# Patient Record
Sex: FEMALE | Race: WHITE | NOT HISPANIC OR LATINO | Employment: OTHER | URBAN - METROPOLITAN AREA
[De-identification: names, ages, dates, MRNs, and addresses within clinical notes are randomized per-mention and may not be internally consistent; named-entity substitution may affect disease eponyms.]

---

## 2018-05-14 PROBLEM — S80.02XA CONTUSION OF LEFT KNEE: Status: ACTIVE | Noted: 2018-05-14

## 2018-05-14 PROBLEM — R06.02 SHORTNESS OF BREATH: Status: ACTIVE | Noted: 2018-05-14

## 2018-05-14 PROBLEM — R09.89 PULMONARY VASCULAR CONGESTION: Status: ACTIVE | Noted: 2018-05-14

## 2018-05-15 PROBLEM — I50.43 ACUTE ON CHRONIC COMBINED SYSTOLIC AND DIASTOLIC HEART FAILURE: Status: ACTIVE | Noted: 2018-05-14

## 2018-05-16 PROBLEM — I50.43 ACUTE ON CHRONIC COMBINED SYSTOLIC AND DIASTOLIC HEART FAILURE: Status: RESOLVED | Noted: 2018-05-14 | Resolved: 2018-05-16

## 2018-05-16 PROBLEM — S80.02XA CONTUSION OF LEFT KNEE: Status: RESOLVED | Noted: 2018-05-14 | Resolved: 2018-05-16

## 2018-05-16 PROBLEM — R06.02 SHORTNESS OF BREATH: Status: RESOLVED | Noted: 2018-05-14 | Resolved: 2018-05-16

## 2018-08-03 ENCOUNTER — OFFICE VISIT (OUTPATIENT)
Dept: PRIMARY CARE CLINIC | Facility: CLINIC | Age: 83
End: 2018-08-03
Payer: MEDICARE

## 2018-08-03 VITALS
HEIGHT: 64 IN | DIASTOLIC BLOOD PRESSURE: 73 MMHG | HEART RATE: 83 BPM | OXYGEN SATURATION: 98 % | SYSTOLIC BLOOD PRESSURE: 133 MMHG | TEMPERATURE: 98 F | RESPIRATION RATE: 18 BRPM | WEIGHT: 204 LBS | BODY MASS INDEX: 34.83 KG/M2

## 2018-08-03 DIAGNOSIS — G20.A1 PARKINSON DISEASE: ICD-10-CM

## 2018-08-03 DIAGNOSIS — E11.42 PERIPHERAL SENSORY NEUROPATHY DUE TO TYPE 2 DIABETES MELLITUS: ICD-10-CM

## 2018-08-03 DIAGNOSIS — E78.5 TYPE 2 DIABETES MELLITUS WITH HYPERLIPIDEMIA: ICD-10-CM

## 2018-08-03 DIAGNOSIS — E11.69 TYPE 2 DIABETES MELLITUS WITH HYPERLIPIDEMIA: ICD-10-CM

## 2018-08-03 DIAGNOSIS — I50.42 CHRONIC COMBINED SYSTOLIC AND DIASTOLIC HEART FAILURE: ICD-10-CM

## 2018-08-03 DIAGNOSIS — E78.5 HYPERLIPIDEMIA, UNSPECIFIED HYPERLIPIDEMIA TYPE: ICD-10-CM

## 2018-08-03 DIAGNOSIS — Z85.038 HISTORY OF COLON CANCER: ICD-10-CM

## 2018-08-03 DIAGNOSIS — Z95.818 STATUS POST PLACEMENT OF IMPLANTABLE LOOP RECORDER: ICD-10-CM

## 2018-08-03 DIAGNOSIS — R29.6 FREQUENT FALLS: ICD-10-CM

## 2018-08-03 DIAGNOSIS — S61.213D LACERATION OF LEFT MIDDLE FINGER WITHOUT FOREIGN BODY WITHOUT DAMAGE TO NAIL, SUBSEQUENT ENCOUNTER: ICD-10-CM

## 2018-08-03 DIAGNOSIS — G25.81 RLS (RESTLESS LEGS SYNDROME): ICD-10-CM

## 2018-08-03 DIAGNOSIS — S20.211D RIB CONTUSION, RIGHT, SUBSEQUENT ENCOUNTER: Primary | ICD-10-CM

## 2018-08-03 DIAGNOSIS — Z90.49 H/O HEMICOLECTOMY: ICD-10-CM

## 2018-08-03 DIAGNOSIS — I10 ESSENTIAL HYPERTENSION, BENIGN: ICD-10-CM

## 2018-08-03 DIAGNOSIS — Z79.01 CHRONIC ANTICOAGULATION: ICD-10-CM

## 2018-08-03 DIAGNOSIS — S30.0XXA CONTUSION OF COCCYX, INITIAL ENCOUNTER: ICD-10-CM

## 2018-08-03 PROCEDURE — 99999 PR PBB SHADOW E&M-NEW PATIENT-LVL V: CPT | Mod: PBBFAC,,, | Performed by: FAMILY MEDICINE

## 2018-08-03 PROCEDURE — 99203 OFFICE O/P NEW LOW 30 MIN: CPT | Mod: S$GLB,,, | Performed by: FAMILY MEDICINE

## 2018-08-03 RX ORDER — GABAPENTIN 100 MG/1
100 CAPSULE ORAL 3 TIMES DAILY
Qty: 90 CAPSULE | Refills: 2 | Status: SHIPPED | OUTPATIENT
Start: 2018-08-03 | End: 2018-10-25 | Stop reason: SDUPTHER

## 2018-08-03 NOTE — PROGRESS NOTES
Subjective:       Patient ID: Evita Cuevas is a 88 y.o. female.    Chief Complaint: Establish Care and Follow-up (patient was seen in the ER on Monday due to falling )    Here to establish primary care services.  She had been going to multiple specialist at [facility].  Has been seeing an endocrinologist there, as well.  Reports that her neurologist is trying to take her off of her Parkinson's meds, though she is not exactly sure why.  She has also been anticoagulated for as long as she can remember, though she is unclear as to the indication for her anticoagulation.  She denies any history of blood clots.  Does have a history of colon cancer, resected via right hemicolectomy in 2015.  Patient is also here for ER follow-up.  She fell at home several days ago.  Says she just lost her balance and fell, sustained a laceration to her left 3rd finger, rib contusion, and hit her head on the way down.  No loss of consciousness.  In the ER, head CT was negative.  Chest x-ray was clear.  X-ray of shoulder showed questionable right AC separation.  Finger laceration was sutured.  Since her fall, she has had increasing right-sided rib pain, as well as pain in her tailbone.  No further falls since earlier in the week, but she does have a history of repeated falls in the past.  Lives alone, depends upon friends for assistance and transportation.      Review of Systems   Constitutional: Negative for fever.   HENT: Negative for trouble swallowing.    Eyes: Negative for visual disturbance.   Respiratory: Negative for shortness of breath.    Cardiovascular: Positive for chest pain and leg swelling. Negative for palpitations.   Gastrointestinal: Negative for blood in stool.   Endocrine: Negative for polydipsia and polyuria.   Genitourinary: Negative for difficulty urinating and hematuria.   Musculoskeletal: Positive for gait problem.   Skin: Positive for wound.   Neurological: Positive for numbness.   Hematological:  "Bruises/bleeds easily.   Psychiatric/Behavioral: Negative for agitation and confusion.       Objective:      Vitals:    08/03/18 0906   BP: 133/73   BP Location: Left arm   Patient Position: Sitting   BP Method: Large (Automatic)   Pulse: 83   Resp: 18   Temp: 98.1 °F (36.7 °C)   TempSrc: Oral   SpO2: 98%   Weight: 92.5 kg (204 lb)   Height: 5' 4" (1.626 m)     Physical Exam   Constitutional: She is oriented to person, place, and time. She appears well-developed and well-nourished.   HENT:   Head: Normocephalic and atraumatic.   Eyes: EOM are normal.   Neck: No JVD present.   Cardiovascular: Normal rate, regular rhythm and normal heart sounds.    Pulmonary/Chest: Effort normal and breath sounds normal.         She exhibits tenderness. She exhibits no crepitus, no edema and no swelling.       Musculoskeletal: She exhibits edema (1+ to mid-calf on right, 2+ on left).        Lumbar back: She exhibits bony tenderness. She exhibits no deformity.        Back:         Hands:  Neurological: She is alert and oriented to person, place, and time.   Skin: Skin is warm and dry.   Psychiatric: She has a normal mood and affect. Her behavior is normal.   Nursing note and vitals reviewed.      Assessment:       1. Rib contusion, right, subsequent encounter    2. Essential hypertension, benign    3. Type 2 diabetes mellitus with hyperlipidemia    4. Hyperlipidemia, unspecified hyperlipidemia type    5. Chronic combined systolic and diastolic heart failure    6. Contusion of coccyx, initial encounter    7. Laceration of left middle finger without foreign body without damage to nail, subsequent encounter    8. Frequent falls    9. 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 Status post placement of implantable loop recorder    11. History of colon cancer    12. H/O hemicolectomy    13. Chronic anticoagulation    14. RLS (restless legs syndrome)    15. Parkinson disease        Plan:       Rib contusion, right, " subsequent encounter  Comments:  X-rays today  Orders:  -     XR Ribs Min 3 Views w/PA Chest Right; Future; Expected date: 08/03/2018    Essential hypertension, benign    Type 2 diabetes mellitus with hyperlipidemia    Hyperlipidemia, unspecified hyperlipidemia type    Chronic combined systolic and diastolic heart failure  Comments:  Has transitioned care to Dr. 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  Dispense: 90 capsule; Refill: 2    Status post placement of implantable loop recorder    History of colon cancer    H/O hemicolectomy    Chronic anticoagulation  Comments:  Unclear indication for anticoagulation, will request records from her previous cardiologist    RLS (restless legs syndrome)    Parkinson disease  Comments:  followed by Dr. Romo at     Need to get medical records from previous PCP, Dr. Dinero, and cardiologist, Dr. Londono  Medication List with Changes/Refills   New Medications    GABAPENTIN (NEURONTIN) 100 MG CAPSULE    Take 1 capsule (100 mg total) by mouth 3 (three) times daily.   Current Medications    APIXABAN (ELIQUIS) 5 MG TAB    Take 5 mg by mouth 2 (two) times daily.    ATORVASTATIN (LIPITOR) 20 MG TABLET    Take 20 mg by mouth once daily.    BRIMONIDINE 0.1% (ALPHAGAN P) 0.1 % DROP    Place 1 drop into both eyes 3 (three) times daily.    CARBIDOPA-LEVODOPA (PARCOPA)  MG PER DISINTEGRATING TABLET    Take 1 tablet by mouth 3 (three) times daily.    DULOXETINE (CYMBALTA) 30 MG CAPSULE    Take 30 mg by  mouth once daily.    FUROSEMIDE (LASIX) 20 MG TABLET    Take 1 tablet (20 mg total) by mouth once daily.    GLIMEPIRIDE (AMARYL) 4 MG TABLET    Take 4 mg by mouth before breakfast.    ISOSORBIDE MONONITRATE (IMDUR) 30 MG 24 HR TABLET    Take 30 mg by mouth once daily.    MELOXICAM (MOBIC) 7.5 MG TABLET    Take 7.5 mg by mouth once daily.    METFORMIN (GLUCOPHAGE) 500 MG TABLET    Take 500 mg by mouth 2 (two) times daily with meals.    METOPROLOL SUCCINATE (TOPROL-XL) 50 MG 24 HR TABLET    Take 1 tablet (50 mg total) by mouth once daily.    QUINAPRIL (ACCUPRIL) 10 MG TABLET    Take 1 tablet (10 mg total) by mouth every evening.    ROPINIROLE (REQUIP) 2 MG TABLET    Take 2 mg by mouth every evening.    SITAGLIPTIN (JANUVIA) 50 MG TAB    Take 100 mg by mouth once daily.    SPIRONOLACTONE (ALDACTONE) 25 MG TABLET    Take 1 tablet (25 mg total) by mouth once daily.   Discontinued Medications    CIPROFLOXACIN HCL (CIPRO) 500 MG TABLET    Take 500 mg by mouth 2 (two) times daily.

## 2018-08-06 ENCOUNTER — TELEPHONE (OUTPATIENT)
Dept: PRIMARY CARE CLINIC | Facility: CLINIC | Age: 83
End: 2018-08-06

## 2018-08-06 NOTE — TELEPHONE ENCOUNTER
----- Message from Ramona Deshpande sent at 8/6/2018  2:36 PM CDT -----  Contact: Patient  Type:  Test Results    Who Called:  [facility]  Name of Test (Lab/Mammo/Etc):  Xray  Date of Test:  08/03/2018  Ordering Provider:  Dr Velazquez  Where the test was performed:  [facility]  Best Call Back Number:  [phone]  Additional Information:  Please call her. Thanks.

## 2018-08-13 ENCOUNTER — OFFICE VISIT (OUTPATIENT)
Dept: PRIMARY CARE CLINIC | Facility: CLINIC | Age: 83
End: 2018-08-13
Payer: MEDICARE

## 2018-08-13 VITALS
HEART RATE: 75 BPM | DIASTOLIC BLOOD PRESSURE: 75 MMHG | WEIGHT: 201.63 LBS | TEMPERATURE: 98 F | BODY MASS INDEX: 34.42 KG/M2 | OXYGEN SATURATION: 96 % | SYSTOLIC BLOOD PRESSURE: 131 MMHG | RESPIRATION RATE: 18 BRPM | HEIGHT: 64 IN

## 2018-08-13 DIAGNOSIS — M79.605 PAIN AND SWELLING OF LEFT LOWER EXTREMITY: Primary | ICD-10-CM

## 2018-08-13 DIAGNOSIS — L03.116 LEFT LEG CELLULITIS: ICD-10-CM

## 2018-08-13 DIAGNOSIS — M79.89 PAIN AND SWELLING OF LEFT LOWER EXTREMITY: Primary | ICD-10-CM

## 2018-08-13 PROCEDURE — 99214 OFFICE O/P EST MOD 30 MIN: CPT | Mod: S$GLB,,, | Performed by: NURSE PRACTITIONER

## 2018-08-13 PROCEDURE — 99999 PR PBB SHADOW E&M-EST. PATIENT-LVL V: CPT | Mod: PBBFAC,,, | Performed by: NURSE PRACTITIONER

## 2018-08-13 RX ORDER — ROPINIROLE 1 MG/1
2 TABLET, FILM COATED ORAL 2 TIMES DAILY
COMMUNITY
Start: 2018-08-08 | End: 2018-10-01 | Stop reason: SDUPTHER

## 2018-08-13 RX ORDER — CLINDAMYCIN HYDROCHLORIDE 300 MG/1
300 CAPSULE ORAL EVERY 8 HOURS
Qty: 30 CAPSULE | Refills: 0 | Status: ON HOLD | OUTPATIENT
Start: 2018-08-13 | End: 2018-09-19 | Stop reason: SDUPTHER

## 2018-08-13 NOTE — PROGRESS NOTES
Chief Complaint  Chief Complaint   Patient presents with    Suture / Staple Removal       HPI  Evita Cuevas is a 88 y.o. female with multiple medical diagnoses as listed in the medical history and problem list that presents for suture removal, left lower extremity swelling, tailbone pain.    Suture removal-patient seen in the ER July 30th for left middle finger laceration status post fall.  Stitches x5 to left middle finger.  States that she  lost  1 over stitches several days ago.  Presents today for removal of sutures.  Have been in total of 13 days.  Denies pain or swelling to the area.  No drainage.    No fever or chills.  Has been treating with over-the-counter Neosporin daily.    Left lower extremity swelling-patient reports the onset of left lower extremity swelling over a year ago.  Reports that she had several ultrasounds negative for DVT at the time.  Presents today with complaints of increased swelling increased redness increased pain to the posterior calf.  History of surgical intervention in the knee?  Ankle?  On that leg.  Patient is currently on Eliquis for anticoagulation-she is unaware of why she needs anticoagulation.    The tailbone pain-patient reports worsening tail bone pain 10/10.  Described as sharp, stabbing, increased with movement.  Status post fall on 07/30 to the ER with completion of x-ray of tail bone that was negative for fracture.      PAST MEDICAL HISTORY:  Past Medical History:   Diagnosis Date    AICD (automatic cardioverter/defibrillator) present     Anticoagulant long-term use     Arthritis     Cancer     Colon cancer     Depression     Diabetes mellitus     Falls     Glaucoma     Hypertension     Incontinence     Pacemaker     Restless leg syndrome        PAST SURGICAL HISTORY:  Past Surgical History:   Procedure Laterality Date    APPENDECTOMY      COLON SURGERY      TONSILLECTOMY         SOCIAL HISTORY:  Social History     Socioeconomic History     Marital status:      Spouse name: Not on file    Number of children: Not on file    Years of education: Not on file    Highest education level: Not on file   Social Needs    Financial resource strain: Not on file    Food insecurity - worry: Not on file    Food insecurity - inability: Not on file    Transportation needs - medical: Not on file    Transportation needs - non-medical: Not on file   Occupational History    Not on file   Tobacco Use    Smoking status: Never Smoker    Smokeless tobacco: Never Used   Substance and Sexual Activity    Alcohol use: No    Drug use: No    Sexual activity: Not on file   Other Topics Concern    Not on file   Social History Narrative    Not on file       FAMILY HISTORY:  Family History   Problem Relation Age of Onset    Heart disease Father        ALLERGIES AND MEDICATIONS: updated and reviewed.  Review of patient's allergies indicates:   Allergen Reactions    Adhesive      Current Outpatient Medications   Medication Sig Dispense Refill    apixaban (ELIQUIS) 5 mg Tab Take 5 mg by mouth 2 (two) times daily.      atorvastatin (LIPITOR) 20 MG tablet Take 20 mg by mouth once daily.      brimonidine 0.1% (ALPHAGAN P) 0.1 % Drop Place 1 drop into both eyes 3 (three) times daily.      carbidopa-levodopa (PARCOPA)  mg per disintegrating tablet Take 1 tablet by mouth 3 (three) times daily.      DULoxetine (CYMBALTA) 30 MG capsule Take 30 mg by mouth once daily.      furosemide (LASIX) 20 MG tablet Take 1 tablet (20 mg total) by mouth once daily. 30 tablet 11    gabapentin (NEURONTIN) 100 MG capsule Take 1 capsule (100 mg total) by mouth 3 (three) times daily. 90 capsule 2    glimepiride (AMARYL) 4 MG tablet Take 4 mg by mouth before breakfast.      isosorbide mononitrate (IMDUR) 30 MG 24 hr tablet Take 30 mg by mouth once daily.      meloxicam (MOBIC) 7.5 MG tablet Take 7.5 mg by mouth once daily.      metFORMIN (GLUCOPHAGE) 500 MG tablet Take 500 mg  "by mouth 2 (two) times daily with meals.      metoprolol succinate (TOPROL-XL) 50 MG 24 hr tablet Take 1 tablet (50 mg total) by mouth once daily. 30 tablet 11    quinapril (ACCUPRIL) 10 MG tablet Take 1 tablet (10 mg total) by mouth every evening. 90 tablet 3    rOPINIRole (REQUIP) 1 MG tablet Take 2 tablets by mouth 2 (two) times daily.      SITagliptin (JANUVIA) 50 MG Tab Take 100 mg by mouth once daily.      spironolactone (ALDACTONE) 25 MG tablet Take 1 tablet (25 mg total) by mouth once daily. 30 tablet 11     No current facility-administered medications for this visit.          ROS  Review of Systems   Constitutional: Negative for chills, fatigue and fever.   HENT: Negative for congestion, rhinorrhea, sinus pressure and sore throat.    Respiratory: Negative for cough, chest tightness and shortness of breath.    Cardiovascular: Positive for leg swelling (Left lower extremity swelling). Negative for chest pain and palpitations.   Gastrointestinal: Negative for blood in stool, diarrhea, nausea and vomiting.   Genitourinary: Negative for dysuria, frequency, hematuria and urgency.   Musculoskeletal: Positive for arthralgias and joint swelling. Negative for back pain.   Skin: Negative for rash and wound.   Neurological: Positive for numbness. Negative for dizziness and headaches.   Psychiatric/Behavioral: Negative for dysphoric mood and sleep disturbance. The patient is not nervous/anxious.          PHYSICAL EXAM  Vitals:    08/13/18 1318   BP: 131/75   BP Location: Right arm   Patient Position: Sitting   BP Method: Medium (Automatic)   Pulse: 75   Resp: 18   Temp: 98 °F (36.7 °C)   TempSrc: Oral   SpO2: 96%   Weight: 91.4 kg (201 lb 9.6 oz)   Height: 5' 4" (1.626 m)    Body mass index is 34.6 kg/m².  Weight: 91.4 kg (201 lb 9.6 oz)   Height: 5' 4" (162.6 cm)     Physical Exam   Constitutional: She is oriented to person, place, and time. She appears well-developed and well-nourished.   HENT:   Head: " Normocephalic.   Right Ear: Tympanic membrane normal.   Left Ear: Tympanic membrane normal.   Mouth/Throat: Uvula is midline, oropharynx is clear and moist and mucous membranes are normal.   Eyes: Conjunctivae are normal.   Cardiovascular: Normal rate, regular rhythm and normal heart sounds.   No murmur heard.  Pulses:       Radial pulses are 2+ on the right side, and 2+ on the left side.        Dorsalis pedis pulses are 1+ on the right side, and 0 on the left side.   Left lower extremity swollen, tender to touch.  Erythematous and warm.   Pulmonary/Chest: Effort normal and breath sounds normal. She has no wheezes.   Abdominal: Soft. Bowel sounds are normal. There is no tenderness.   Musculoskeletal: She exhibits no edema.   Feet:   Right Foot:   Protective Sensation: 6 sites tested. 0 sites sensed.   Skin Integrity: Positive for callus. Negative for skin breakdown.   Left Foot:   Protective Sensation: 6 sites tested. 2 sites sensed.   Skin Integrity: Positive for callus. Negative for skin breakdown.   Lymphadenopathy:     She has no cervical adenopathy.   Neurological: She is alert and oriented to person, place, and time.   Skin: Skin is warm and dry. No rash noted.   Psychiatric: She has a normal mood and affect.         Health Maintenance       Date Due Completion Date    Lipid Panel 06/07/1930 ---    Foot Exam 06/07/1940 ---    Eye Exam 06/07/1940 ---    TETANUS VACCINE 06/07/1948 ---    Zoster Vaccine 06/07/1990 ---    Pneumococcal (65+) (1 of 2 - PCV13) 06/07/1995 ---    Influenza Vaccine 08/01/2018 ---    Hemoglobin A1c 11/15/2018 5/15/2018            Assessment & Plan      Evita was seen today for suture / staple removal.    Diagnoses and all orders for this visit:    Pain and swelling of left lower extremity  -     Cancel: US Lower Extremity Veins Left; Future  -     US Lower Extremity Veins Left; Future    Left leg cellulitis  -     US Lower Extremity Veins Left; Future  -     clindamycin (CLEOCIN) 300 MG  capsule; Take 1 capsule (300 mg total) by mouth every 8 (eight) hours.              Follow-up: No Follow-up on file.

## 2018-08-24 ENCOUNTER — TELEPHONE (OUTPATIENT)
Dept: PRIMARY CARE CLINIC | Facility: CLINIC | Age: 83
End: 2018-08-24

## 2018-08-24 NOTE — TELEPHONE ENCOUNTER
----- Message from Gracie Lopez sent at 8/24/2018  9:05 AM CDT -----  Contact: [phone]  Patient is requesting a call back from the nurse.    Please call the patient upon request at phone number [phone].

## 2018-08-24 NOTE — TELEPHONE ENCOUNTER
----- Message from Rebeca Perez sent at 8/24/2018 11:09 AM CDT -----  Contact: pt  Type:  Patient Returning Call    Who Called: Evita Cuevas (Pt)  Who Left Message for Patient: no message  Does the patient know what this is regarding?:  Not sure   Best Call Back Number:    Additional Information:  Pt states she was expecting a call from nurse  Please  Call pt to advise  Thanks

## 2018-08-24 NOTE — TELEPHONE ENCOUNTER
Patient says she receive a letter in the mail that the medical release was sent to the wrong place. I told her bring the letter in with her on Monday and we will fill a new out when she comes in. She states understanding

## 2018-08-27 ENCOUNTER — OFFICE VISIT (OUTPATIENT)
Dept: PRIMARY CARE CLINIC | Facility: CLINIC | Age: 83
End: 2018-08-27
Payer: MEDICARE

## 2018-08-27 VITALS
BODY MASS INDEX: 34.31 KG/M2 | HEIGHT: 64 IN | DIASTOLIC BLOOD PRESSURE: 45 MMHG | TEMPERATURE: 98 F | OXYGEN SATURATION: 98 % | WEIGHT: 201 LBS | SYSTOLIC BLOOD PRESSURE: 85 MMHG | HEART RATE: 65 BPM | RESPIRATION RATE: 18 BRPM

## 2018-08-27 DIAGNOSIS — I95.9 HYPOTENSION, UNSPECIFIED HYPOTENSION TYPE: ICD-10-CM

## 2018-08-27 DIAGNOSIS — R42 DIZZINESS: ICD-10-CM

## 2018-08-27 DIAGNOSIS — R06.02 SHORTNESS OF BREATH: ICD-10-CM

## 2018-08-27 DIAGNOSIS — L03.116 CELLULITIS OF LEFT LOWER EXTREMITY: Primary | ICD-10-CM

## 2018-08-27 DIAGNOSIS — E78.5 HYPERLIPIDEMIA, UNSPECIFIED HYPERLIPIDEMIA TYPE: ICD-10-CM

## 2018-08-27 DIAGNOSIS — I10 ESSENTIAL HYPERTENSION, BENIGN: ICD-10-CM

## 2018-08-27 PROCEDURE — 99214 OFFICE O/P EST MOD 30 MIN: CPT | Mod: S$GLB,,, | Performed by: NURSE PRACTITIONER

## 2018-08-27 PROCEDURE — 99999 PR PBB SHADOW E&M-EST. PATIENT-LVL V: CPT | Mod: PBBFAC,,, | Performed by: NURSE PRACTITIONER

## 2018-08-27 NOTE — PROGRESS NOTES
"Chief Complaint  Chief Complaint   Patient presents with    Follow-up     cellulitis       HPI  Evita Cuevas is a 88 y.o. female with multiple medical diagnoses as listed in the medical history and problem list that presents for left leg swelling, dizziness, sob.    Left lower extremity swelling-patient previously seen 1 week ago for left lower extremity swelling.  Venous ultrasound completed without noted due to a venous reflux.  Started on clindamycin for possible cellulitis.  Has completed clindamycin x7 days today.  Presents to clinic with no improvement lower extremity swelling.  Complaining of tenderness to touch in increased erythema.  States that she has had this swelling previously in the past but never this bad.    Fatigue-reports new onset fatigue, dizziness, shortness of breath.  Patient reports being "winded" with any movement.  No syncope.  Presents to clinic with hypotension.  Manual blood pressure 85/45.  Reports possible dehydration with decreased fluid intake due to her fatigue.  No cough.  No fever or chills.      Epigastric pain - Also reports an episode of "indigestion" 3 days ago in the middle of the night.  Described as burning in her esophagus.  Accompanied by chest tightness and pressure.  Took nitroglycerin with no improvement in pain.  Pain lasts approximately 6 hr.  No noted triggers to cause reflux.  No associated vomiting.  No radiation.  No subsequent episodes.        PAST MEDICAL HISTORY:  Past Medical History:   Diagnosis Date    AICD (automatic cardioverter/defibrillator) present     Anticoagulant long-term use     Arthritis     Cancer     Colon cancer     Depression     Diabetes mellitus     Falls     Glaucoma     Hypertension     Incontinence     Pacemaker     Restless leg syndrome        PAST SURGICAL HISTORY:  Past Surgical History:   Procedure Laterality Date    APPENDECTOMY      COLON SURGERY      TONSILLECTOMY         SOCIAL HISTORY:  Social History "     Socioeconomic History    Marital status:      Spouse name: Not on file    Number of children: Not on file    Years of education: Not on file    Highest education level: Not on file   Social Needs    Financial resource strain: Not on file    Food insecurity - worry: Not on file    Food insecurity - inability: Not on file    Transportation needs - medical: Not on file    Transportation needs - non-medical: Not on file   Occupational History    Not on file   Tobacco Use    Smoking status: Never Smoker    Smokeless tobacco: Never Used   Substance and Sexual Activity    Alcohol use: No    Drug use: No    Sexual activity: Not on file   Other Topics Concern    Not on file   Social History Narrative    Not on file       FAMILY HISTORY:  Family History   Problem Relation Age of Onset    Heart disease Father        ALLERGIES AND MEDICATIONS: updated and reviewed.  Review of patient's allergies indicates:   Allergen Reactions    Adhesive      Current Outpatient Medications   Medication Sig Dispense Refill    apixaban (ELIQUIS) 5 mg Tab Take 5 mg by mouth 2 (two) times daily.      atorvastatin (LIPITOR) 20 MG tablet Take 20 mg by mouth once daily.      brimonidine 0.1% (ALPHAGAN P) 0.1 % Drop Place 1 drop into both eyes 3 (three) times daily.      carbidopa-levodopa (PARCOPA)  mg per disintegrating tablet Take 1 tablet by mouth 3 (three) times daily.      clindamycin (CLEOCIN) 300 MG capsule Take 1 capsule (300 mg total) by mouth every 8 (eight) hours. 30 capsule 0    DULoxetine (CYMBALTA) 30 MG capsule Take 30 mg by mouth once daily.      furosemide (LASIX) 20 MG tablet Take 1 tablet (20 mg total) by mouth once daily. 30 tablet 11    gabapentin (NEURONTIN) 100 MG capsule Take 1 capsule (100 mg total) by mouth 3 (three) times daily. 90 capsule 2    glimepiride (AMARYL) 4 MG tablet Take 4 mg by mouth before breakfast.      isosorbide mononitrate (IMDUR) 30 MG 24 hr tablet Take 30 mg  by mouth once daily.      meloxicam (MOBIC) 7.5 MG tablet Take 7.5 mg by mouth once daily.      metFORMIN (GLUCOPHAGE) 500 MG tablet Take 500 mg by mouth 2 (two) times daily with meals.      metoprolol succinate (TOPROL-XL) 50 MG 24 hr tablet Take 1 tablet (50 mg total) by mouth once daily. 30 tablet 11    quinapril (ACCUPRIL) 10 MG tablet Take 1 tablet (10 mg total) by mouth every evening. 90 tablet 3    rOPINIRole (REQUIP) 1 MG tablet Take 2 tablets by mouth 2 (two) times daily.      SITagliptin (JANUVIA) 50 MG Tab Take 100 mg by mouth once daily.      spironolactone (ALDACTONE) 25 MG tablet Take 1 tablet (25 mg total) by mouth once daily. 30 tablet 11     No current facility-administered medications for this visit.          ROS  Review of Systems   Constitutional: Positive for fatigue. Negative for chills and fever.   HENT: Negative for congestion, rhinorrhea, sinus pressure and sore throat.    Respiratory: Positive for shortness of breath. Negative for cough and chest tightness.    Cardiovascular: Positive for chest pain and leg swelling (Left lower extremity swelling chronically, worsening). Negative for palpitations.   Gastrointestinal: Positive for abdominal pain. Negative for blood in stool, diarrhea, nausea and vomiting.   Genitourinary: Negative for dysuria, frequency, hematuria and urgency.   Musculoskeletal: Negative for arthralgias and back pain.   Skin: Positive for rash. Negative for wound.   Neurological: Positive for dizziness, weakness and light-headedness. Negative for headaches.   Psychiatric/Behavioral: Negative for dysphoric mood and sleep disturbance. The patient is not nervous/anxious.          PHYSICAL EXAM  Vitals:    08/27/18 1118 08/27/18 1157   BP: (!) 106/47 (!) 85/45   BP Location: Right arm Left arm   Patient Position: Sitting Sitting   BP Method: Medium (Automatic) Medium (Manual)   Pulse: 69 65   Resp: 18    Temp: 97.8 °F (36.6 °C)    TempSrc: Oral    SpO2: 98%    Weight:  "91.2 kg (201 lb)    Height: 5' 4" (1.626 m)     Body mass index is 34.5 kg/m².  Weight: 91.2 kg (201 lb)   Height: 5' 4" (162.6 cm)     Physical Exam   Constitutional: She is oriented to person, place, and time. She appears well-developed and well-nourished. She appears lethargic.   Patient appears lethargic, fatigued.   HENT:   Head: Normocephalic.   Right Ear: Tympanic membrane normal.   Left Ear: Tympanic membrane normal.   Mouth/Throat: Uvula is midline, oropharynx is clear and moist and mucous membranes are normal.   Eyes: Conjunctivae are normal.   Cardiovascular: Normal rate, regular rhythm, normal heart sounds and normal pulses.   No murmur heard.  Pulses:       Radial pulses are 2+ on the right side, and 2+ on the left side.   +4 LE swelling noted to left lower extremity.  Accompanied by lower extremity wellness.  Tenderness to touch.  Scaly appearance.   Pulmonary/Chest: Effort normal and breath sounds normal. She has no wheezes.   Breath sounds equal bilaterally.  Crackles noted to left lower base.  No wheezing.   Abdominal: Soft. Bowel sounds are normal. There is no tenderness.   Musculoskeletal: She exhibits no edema.   Lymphadenopathy:     She has no cervical adenopathy.   Neurological: She is oriented to person, place, and time. She appears lethargic.   Skin: Skin is warm and dry. No rash noted.   Psychiatric: She has a normal mood and affect.         Health Maintenance       Date Due Completion Date    Lipid Panel 06/07/1930 ---    Eye Exam 06/07/1940 ---    TETANUS VACCINE 06/07/1948 ---    Zoster Vaccine 06/07/1990 ---    Pneumococcal (65+) (1 of 2 - PCV13) 06/07/1995 ---    Influenza Vaccine 08/01/2018 ---    Hemoglobin A1c 11/15/2018 5/15/2018    Foot Exam 08/13/2019 8/13/2018            Assessment & Plan    Evita was seen today for follow-up.    Diagnoses and all orders for this visit:    Cellulitis of left lower extremity  Lab work to rule out cellulitis?  Possible IV antibiotics for cellulitis " resistant to clindamycin?  Essential hypertension, benign  Hold antihypertensives at this time.    Hyperlipidemia, unspecified hyperlipidemia type    Hypotension, unspecified hypotension type  EKG completed.  Sinus bradycardia with PACs.  Left bundle branch block unchanged from previous EKG.  Manual blood pressure per NP 85/45.    Dizziness  Probably related to hypotension - no new blood pressure medications at this time.  Reports that she may be mildly dehydrated..  Patient needs lab work at this time.  Sent to the ER via wheelchair to determine cause of hypotension.    Shortness of breath  Chest x-ray per ER at this time.  Crackles noted left base.  Echo determine change in function?  Patient has not been a cardiologist in over 6 months.    Patient to ER via wheelchair.  Report called to ER MD.  Questions addressed.        Follow-up: No Follow-up on file.

## 2018-08-29 ENCOUNTER — OFFICE VISIT (OUTPATIENT)
Dept: PRIMARY CARE CLINIC | Facility: CLINIC | Age: 83
End: 2018-08-29
Payer: MEDICARE

## 2018-08-29 VITALS
BODY MASS INDEX: 34.95 KG/M2 | TEMPERATURE: 98 F | DIASTOLIC BLOOD PRESSURE: 87 MMHG | HEART RATE: 63 BPM | RESPIRATION RATE: 18 BRPM | WEIGHT: 204.69 LBS | HEIGHT: 64 IN | SYSTOLIC BLOOD PRESSURE: 112 MMHG | OXYGEN SATURATION: 99 %

## 2018-08-29 DIAGNOSIS — I95.9 HYPOTENSION, UNSPECIFIED HYPOTENSION TYPE: Primary | ICD-10-CM

## 2018-08-29 DIAGNOSIS — E78.5 TYPE 2 DIABETES MELLITUS WITH HYPERLIPIDEMIA: ICD-10-CM

## 2018-08-29 DIAGNOSIS — E11.69 TYPE 2 DIABETES MELLITUS WITH HYPERLIPIDEMIA: ICD-10-CM

## 2018-08-29 DIAGNOSIS — E78.5 HYPERLIPIDEMIA, UNSPECIFIED HYPERLIPIDEMIA TYPE: ICD-10-CM

## 2018-08-29 DIAGNOSIS — I10 ESSENTIAL HYPERTENSION, BENIGN: ICD-10-CM

## 2018-08-29 DIAGNOSIS — L03.116 CELLULITIS OF LEFT LOWER EXTREMITY: ICD-10-CM

## 2018-08-29 PROCEDURE — 99999 PR PBB SHADOW E&M-EST. PATIENT-LVL III: CPT | Mod: PBBFAC,,, | Performed by: FAMILY MEDICINE

## 2018-08-29 PROCEDURE — 99214 OFFICE O/P EST MOD 30 MIN: CPT | Mod: S$GLB,,, | Performed by: FAMILY MEDICINE

## 2018-08-29 NOTE — PROGRESS NOTES
"Subjective:       Patient ID: Evita Cuevas is a 88 y.o. female.    Chief Complaint: Follow-up (hospital f/u )    Patient is seen in the emergency room 2 days ago with hypotension and persistent left lower extremity cellulitis.  Keflex added to her antibiotic regimen.  Repeat ultrasound negative for DVT.  Her leg is feeling a little better, though she does report persistent swelling, better when she elevates it at night.  Has tried compression stockings in the past, but she has too difficult time putting them on to use them with any type of regularity.  Has been monitoring her blood pressure regularly as well.  Blood pressures been consistently low-normal, still getting dizzy and lightheaded at times.  Denies chest pain, palpitations or shortness of breath.      Review of Systems   Constitutional: Positive for fatigue. Negative for fever.   HENT: Negative for trouble swallowing.    Eyes: Negative for visual disturbance.   Respiratory: Negative for shortness of breath.    Cardiovascular: Positive for leg swelling. Negative for chest pain and palpitations.   Gastrointestinal: Negative for diarrhea and vomiting.   Genitourinary: Negative for difficulty urinating.   Musculoskeletal: Positive for joint swelling.   Skin: Positive for color change.   Neurological: Positive for dizziness.   Hematological: Bruises/bleeds easily.   Psychiatric/Behavioral: Negative for agitation and confusion.       Objective:      Vitals:    08/29/18 1409   BP: 112/87   BP Location: Right arm   Patient Position: Sitting   BP Method: Medium (Automatic)   Pulse: 63   Resp: 18   Temp: 97.7 °F (36.5 °C)   TempSrc: Oral   SpO2: 99%   Weight: 92.9 kg (204 lb 11.2 oz)   Height: 5' 4" (1.626 m)     Physical Exam   Constitutional: She is oriented to person, place, and time. She appears well-developed and well-nourished.   HENT:   Head: Normocephalic and atraumatic.   Neck: No JVD present.   Cardiovascular: Normal rate, regular rhythm and " normal heart sounds.   Pulmonary/Chest: Effort normal and breath sounds normal.   Musculoskeletal: She exhibits edema (2+ LLE edema).   Neurological: She is alert and oriented to person, place, and time.   Skin: Skin is warm and dry. There is erythema (mild erythemat, warmth and tenderness to left lower leg, no ulceration or skin breakdown).   Psychiatric: She has a normal mood and affect. Her behavior is normal.   Nursing note and vitals reviewed.      Assessment:       1. Hypotension, unspecified hypotension type    2. Cellulitis of left lower extremity    3. Type 2 diabetes mellitus with hyperlipidemia    4. Hyperlipidemia, unspecified hyperlipidemia type    5. Essential hypertension, benign        Plan:       Hypotension, unspecified hypotension type  Stop Imdur, continue monitoring blood pressure regularly  Cellulitis of left lower extremity  Complete course of antibiotics, elevate leg as much as possible  Type 2 diabetes mellitus with hyperlipidemia  -     Hemoglobin A1c; Future; Expected date: 08/29/2018  -     Microalbumin/creatinine urine ratio; Future; Expected date: 08/29/2018    Hyperlipidemia, unspecified hyperlipidemia type  -     Comprehensive metabolic panel; Future; Expected date: 08/29/2018  -     Lipid panel; Future; Expected date: 08/29/2018    Essential hypertension, benign  -     CBC auto differential; Future; Expected date: 08/29/2018    Patient advised to go to the emergency room for any new or worsening symptoms  Medication List with Changes/Refills   Current Medications    APIXABAN (ELIQUIS) 5 MG TAB    Take 5 mg by mouth 2 (two) times daily.    ATORVASTATIN (LIPITOR) 20 MG TABLET    Take 20 mg by mouth once daily.    BRIMONIDINE 0.1% (ALPHAGAN P) 0.1 % DROP    Place 1 drop into both eyes 3 (three) times daily.    CARBIDOPA-LEVODOPA (PARCOPA)  MG PER DISINTEGRATING TABLET    Take 1 tablet by mouth 3 (three) times daily.    CEPHALEXIN (KEFLEX) 500 MG CAPSULE    Take 1 capsule (500 mg  total) by mouth every 6 (six) hours. for 10 days    CLINDAMYCIN (CLEOCIN) 300 MG CAPSULE    Take 1 capsule (300 mg total) by mouth every 8 (eight) hours.    DULOXETINE (CYMBALTA) 30 MG CAPSULE    Take 30 mg by mouth once daily.    FUROSEMIDE (LASIX) 20 MG TABLET    Take 1 tablet (20 mg total) by mouth once daily.    GABAPENTIN (NEURONTIN) 100 MG CAPSULE    Take 1 capsule (100 mg total) by mouth 3 (three) times daily.    GLIMEPIRIDE (AMARYL) 4 MG TABLET    Take 4 mg by mouth before breakfast.    MELOXICAM (MOBIC) 7.5 MG TABLET    Take 7.5 mg by mouth once daily.    METFORMIN (GLUCOPHAGE) 500 MG TABLET    Take 500 mg by mouth 2 (two) times daily with meals.    METOPROLOL SUCCINATE (TOPROL-XL) 50 MG 24 HR TABLET    Take 1 tablet (50 mg total) by mouth once daily.    QUINAPRIL (ACCUPRIL) 10 MG TABLET    Take 1 tablet (10 mg total) by mouth every evening.    ROPINIROLE (REQUIP) 1 MG TABLET    Take 2 tablets by mouth 2 (two) times daily.    SITAGLIPTIN (JANUVIA) 50 MG TAB    Take 100 mg by mouth once daily.    SPIRONOLACTONE (ALDACTONE) 25 MG TABLET    Take 1 tablet (25 mg total) by mouth once daily.   Discontinued Medications    ISOSORBIDE MONONITRATE (IMDUR) 30 MG 24 HR TABLET    Take 30 mg by mouth once daily.

## 2018-09-04 ENCOUNTER — TELEPHONE (OUTPATIENT)
Dept: PRIMARY CARE CLINIC | Facility: CLINIC | Age: 83
End: 2018-09-04

## 2018-09-04 RX ORDER — BACLOFEN 10 MG/1
10 TABLET ORAL 2 TIMES DAILY PRN
Qty: 60 TABLET | Refills: 0 | Status: SHIPPED | OUTPATIENT
Start: 2018-09-04 | End: 2018-10-01 | Stop reason: SDUPTHER

## 2018-09-04 NOTE — TELEPHONE ENCOUNTER
----- Message from Hema Whittington sent at 9/4/2018 12:09 PM CDT -----      Who Called:  Patient         Preferred Pharmacy with phone number:    [id number] - [place] - [address]  [address]  [place] [address]  Phone: [phone] Fax: [phone]    Best Call Back Number:  [phone]  Additional Information: Patient calling.  States that she needs a prescription to help with her ragini horses in her legs

## 2018-09-17 ENCOUNTER — TELEPHONE (OUTPATIENT)
Dept: PRIMARY CARE CLINIC | Facility: CLINIC | Age: 83
End: 2018-09-17

## 2018-09-17 PROBLEM — L03.116 CELLULITIS OF LEFT LOWER LEG: Status: ACTIVE | Noted: 2018-09-17

## 2018-09-17 NOTE — TELEPHONE ENCOUNTER
Patient states she is dizzy and her BP is 86/29 with a heart rate of 53. She has taken her Metoprolol today. I asked her if she had someone there to bring her to the ER, she said she was going to call [facility] and have them bring her to the ER.

## 2018-09-18 PROBLEM — I16.0 HYPERTENSIVE URGENCY: Status: ACTIVE | Noted: 2018-09-18

## 2018-09-25 ENCOUNTER — TELEPHONE (OUTPATIENT)
Dept: PRIMARY CARE CLINIC | Facility: CLINIC | Age: 83
End: 2018-09-25

## 2018-09-25 PROBLEM — R09.02 HYPOXIA: Status: ACTIVE | Noted: 2018-09-25

## 2018-09-25 PROBLEM — N17.9 AKI (ACUTE KIDNEY INJURY): Status: ACTIVE | Noted: 2018-09-25

## 2018-09-25 PROBLEM — I95.9 HYPOTENSION: Status: ACTIVE | Noted: 2018-09-25

## 2018-09-25 PROCEDURE — G0180 MD CERTIFICATION HHA PATIENT: HCPCS | Mod: ,,, | Performed by: HOSPITALIST

## 2018-09-25 PROCEDURE — G0180 PR HOME HEALTH MD CERTIFICATION: ICD-10-PCS | Mod: ,,, | Performed by: HOSPITALIST

## 2018-09-25 NOTE — TELEPHONE ENCOUNTER
Spoke to the patient and told her that I spoke to the  nurse and her BP is very low. The patient says she also feels weak. I notified the patient to go to the ER and she states understanding. She says her grandson is going to bring her

## 2018-09-25 NOTE — TELEPHONE ENCOUNTER
----- Message from Ramona Deshpande sent at 9/25/2018  2:57 PM CDT -----  Contact: Patient  Type:  Patient Returning Call    Who Called:  sebastian Jama  Who Left Message for Patient:  N/A  Does the patient know what this is regarding?:  N/A  Best Call Back Number:  [phone]  Additional Information:  Missed your call, please call hr back. Thanks.

## 2018-09-25 NOTE — TELEPHONE ENCOUNTER
----- Message from Ramona Deshpande sent at 9/25/2018  1:35 PM CDT -----  Contact: Meri with [employer] phone [phone]  Meri with [employer] phone [phone], Calling to inform you dave blood pressure is 90/50, automatic is 104/44, heart rate 46 was the highest. Please call her. Thanks.

## 2018-09-26 PROBLEM — R53.1 GENERALIZED WEAKNESS: Status: ACTIVE | Noted: 2018-09-26

## 2018-09-26 PROBLEM — R55 NEAR SYNCOPE: Status: ACTIVE | Noted: 2018-09-26

## 2018-09-27 PROBLEM — M79.662 PAIN OF LEFT CALF: Status: ACTIVE | Noted: 2018-09-27

## 2018-09-27 PROBLEM — M25.552 HIP PAIN, ACUTE, LEFT: Status: ACTIVE | Noted: 2018-09-27

## 2018-09-27 PROBLEM — R00.1 BRADYCARDIA: Status: ACTIVE | Noted: 2018-09-27

## 2018-10-01 RX ORDER — ROPINIROLE 1 MG/1
2 TABLET, FILM COATED ORAL 2 TIMES DAILY
Qty: 360 TABLET | Refills: 3 | Status: SHIPPED | OUTPATIENT
Start: 2018-10-01

## 2018-10-01 RX ORDER — BACLOFEN 10 MG/1
10 TABLET ORAL 2 TIMES DAILY PRN
Qty: 60 TABLET | Refills: 1 | Status: ON HOLD | OUTPATIENT
Start: 2018-10-01 | End: 2018-10-05 | Stop reason: HOSPADM

## 2018-10-01 NOTE — TELEPHONE ENCOUNTER
----- Message from Niharika Wright sent at 10/1/2018 11:36 AM CDT -----  Contact: Stephanie with [facility]   Stephanie with [facility] called, Patient need a refill on rx rOPINIRole (REQUIP) 1 MG tablet and atenolol. Please send to [facility] please call back at  [phone] if any questions.

## 2018-10-04 PROBLEM — I50.32 CHRONIC DIASTOLIC CHF (CONGESTIVE HEART FAILURE): Status: ACTIVE | Noted: 2018-10-04

## 2018-10-04 PROBLEM — R55 VASOVAGAL SYNCOPE: Status: ACTIVE | Noted: 2018-10-04

## 2018-10-05 ENCOUNTER — TELEPHONE (OUTPATIENT)
Dept: PRIMARY CARE CLINIC | Facility: CLINIC | Age: 83
End: 2018-10-05

## 2018-10-05 NOTE — TELEPHONE ENCOUNTER
----- Message from Jessie Groves sent at 10/5/2018  2:49 PM CDT -----  Type:  Sooner Apoointment Request    Caller is requesting a sooner appointment.  Caller declined first available appointment listed below.  Caller will not accept being placed on the waitlist and is requesting a message be sent to doctor.    Name of Caller:  pt  When is the first available appointment?   Pt  Has an pierre   For  Oct 11  Symptoms:  Hospital  Follow up  Best Call Back Number:  [phone]

## 2018-10-05 NOTE — TELEPHONE ENCOUNTER
----- Message from Evelyne Matson sent at 10/5/2018  2:45 PM CDT -----  Contact: Fozia  Type: Needs Medical Advice    Who Called: Fozia with [facility] (nurse)  Symptoms (please be specific):    How long has patient had these symptoms:    Pharmacy name and phone #:    Best Call Back Number: [phone]  Additional Information: called to advise that patient was seen by ER last night,requesting a call back to patient went in for  Headaches shivering,and diahrea.need to discuss medications and plan of care

## 2018-10-08 ENCOUNTER — TELEPHONE (OUTPATIENT)
Dept: PRIMARY CARE CLINIC | Facility: CLINIC | Age: 83
End: 2018-10-08

## 2018-10-08 ENCOUNTER — TELEPHONE (OUTPATIENT)
Dept: ADMINISTRATIVE | Facility: CLINIC | Age: 83
End: 2018-10-08

## 2018-10-08 NOTE — TELEPHONE ENCOUNTER
----- Message from Evelyne Matson sent at 10/8/2018  4:41 PM CDT -----  Contact: patient  Type:  Patient Returning Call    Who Called:  patient  Who Left Message for Patient:  Keturah  Does the patient know what this is regarding?:  no  Best Call Back Number:  [phone]  Additional Information:  Requesting a call back

## 2018-10-11 ENCOUNTER — OFFICE VISIT (OUTPATIENT)
Dept: PRIMARY CARE CLINIC | Facility: CLINIC | Age: 83
End: 2018-10-11
Payer: MEDICARE

## 2018-10-11 VITALS
HEART RATE: 115 BPM | OXYGEN SATURATION: 95 % | HEIGHT: 64 IN | RESPIRATION RATE: 16 BRPM | DIASTOLIC BLOOD PRESSURE: 81 MMHG | WEIGHT: 190 LBS | TEMPERATURE: 98 F | SYSTOLIC BLOOD PRESSURE: 138 MMHG | BODY MASS INDEX: 32.44 KG/M2

## 2018-10-11 DIAGNOSIS — I95.1 ORTHOSTATIC HYPOTENSION: Primary | ICD-10-CM

## 2018-10-11 DIAGNOSIS — Z23 NEED FOR PROPHYLACTIC VACCINATION AND INOCULATION AGAINST INFLUENZA: ICD-10-CM

## 2018-10-11 DIAGNOSIS — Z23 NEED FOR VACCINATION: ICD-10-CM

## 2018-10-11 DIAGNOSIS — M65.322 TRIGGER INDEX FINGER OF LEFT HAND: ICD-10-CM

## 2018-10-11 PROBLEM — N17.9 AKI (ACUTE KIDNEY INJURY): Status: RESOLVED | Noted: 2018-09-25 | Resolved: 2018-10-11

## 2018-10-11 PROCEDURE — 99214 OFFICE O/P EST MOD 30 MIN: CPT | Mod: S$PBB,,, | Performed by: FAMILY MEDICINE

## 2018-10-11 PROCEDURE — 1101F PT FALLS ASSESS-DOCD LE1/YR: CPT | Mod: CPTII,,, | Performed by: FAMILY MEDICINE

## 2018-10-11 PROCEDURE — G0008 ADMIN INFLUENZA VIRUS VAC: HCPCS | Mod: PBBFAC,PN

## 2018-10-11 PROCEDURE — 99999 PR PBB SHADOW E&M-EST. PATIENT-LVL IV: CPT | Mod: PBBFAC,,, | Performed by: FAMILY MEDICINE

## 2018-10-11 PROCEDURE — 99214 OFFICE O/P EST MOD 30 MIN: CPT | Mod: PBBFAC,PN,25 | Performed by: FAMILY MEDICINE

## 2018-10-11 RX ORDER — QUINAPRIL 10 MG/1
10 TABLET ORAL DAILY
Qty: 90 TABLET | Refills: 1 | Status: SHIPPED | OUTPATIENT
Start: 2018-10-11 | End: 2018-10-19

## 2018-10-11 NOTE — PROGRESS NOTES
"Subjective:       Patient ID: Evita Cuevas is a 88 y.o. female.    Chief Complaint: Hypotension (patient is here to follow up )    Patient has been admitted to the hospital several times in the past month or so with hypotension and near syncope, felt to be due to dehydration with acute renal insufficiency.  Responded well to IV fluids.  Since discharge, she reports that she is still getting dizzy and lightheaded at times.  No recent fall or injury.  Denies chest pain or palpitations.  No nausea, vomiting or diarrhea.  Has a home blood pressure cuff, but has not been checking her blood pressure regularly.      Review of Systems   Constitutional: Negative for fever.   HENT: Negative for trouble swallowing.    Respiratory: Negative for shortness of breath.    Cardiovascular: Positive for leg swelling. Negative for chest pain and palpitations.   Gastrointestinal: Negative for diarrhea and vomiting.   Genitourinary: Negative for difficulty urinating.   Musculoskeletal: Positive for arthralgias and gait problem.   Skin: Negative for wound.   Neurological: Positive for dizziness. Negative for syncope.   Hematological: Bruises/bleeds easily.   Psychiatric/Behavioral: Negative for agitation and confusion.       Objective:      Vitals:    10/11/18 1529 10/11/18 1620 10/11/18 1622 10/11/18 1623   BP: (!) 94/50 (!) 83/49 113/65 138/81   BP Location: Left arm Left arm Left arm Left arm   Patient Position: Sitting Lying Sitting Standing   BP Method: Medium (Automatic) Medium (Automatic) Medium (Automatic) Medium (Automatic)   Pulse: (!) 56 (!) 58 62 (!) 115   Resp: 16      Temp: 98.1 °F (36.7 °C)      TempSrc: Oral      SpO2: 95%      Weight: 86.2 kg (190 lb)      Height: 5' 4" (1.626 m)        Lab Results   Component Value Date    WBC 8.20 10/04/2018    HGB 11.7 (L) 10/04/2018    HCT 35.2 (L) 10/04/2018     10/04/2018    ALT 17 10/04/2018    AST 45 (H) 10/04/2018     10/05/2018    K 4.0 10/05/2018     " 10/05/2018    CREATININE 0.6 10/05/2018    BUN 16 10/05/2018    CO2 26 10/05/2018    TSH 1.57 05/15/2018    INR 1.1 09/25/2018    HGBA1C 6.7 (H) 10/04/2018     Physical Exam   Constitutional: She is oriented to person, place, and time. She appears well-developed and well-nourished.   HENT:   Head: Normocephalic and atraumatic.   Eyes: EOM are normal.   Neck: No JVD present.   Cardiovascular: Normal rate, regular rhythm and normal heart sounds.   Pulmonary/Chest: Effort normal and breath sounds normal.   Abdominal: Soft. She exhibits no distension. There is no tenderness.   Musculoskeletal: She exhibits edema (2+).   Neurological: She is alert and oriented to person, place, and time.   Skin: Skin is warm and dry.   Psychiatric: She has a normal mood and affect. Her behavior is normal.   Nursing note and vitals reviewed.      Assessment:       1. Orthostatic hypotension    2. Trigger index finger of left hand    3. Need for vaccination    4. Need for prophylactic vaccination and inoculation against influenza        Plan:       Orthostatic hypotension  Paradoxical blood pressure response upon standing, but became significantly tachycardic.  Will decrease dose of ACE-inhibitor, patient instructed to start monitoring blood pressure and heart rate at home  Trigger index finger of left hand  -     Ambulatory Referral to Orthopedics    Need for vaccination  Comments:  flu shot today    Need for prophylactic vaccination and inoculation against influenza  -     Flu Vaccine - High Dose (PF) (65+)    Other orders  -     quinapril (ACCUPRIL) 10 MG tablet; Take 1 tablet (10 mg total) by mouth once daily.  Dispense: 90 tablet; Refill: 1         Medication List           Accurate as of 10/11/18  5:14 PM. If you have any questions, ask your nurse or doctor.               CHANGE how you take these medications    quinapril 10 MG tablet  Commonly known as:  ACCUPRIL  Take 1 tablet (10 mg total) by mouth once daily.  What changed:     · medication strength  · how much to take  Changed by:  Tito Velazquez MD        CONTINUE taking these medications    atorvastatin 20 MG tablet  Commonly known as:  LIPITOR     brimonidine 0.1% 0.1 % Drop  Commonly known as:  ALPHAGAN P     carbidopa-levodopa  mg per disintegrating tablet  Commonly known as:  PARCOPA     DULoxetine 30 MG capsule  Commonly known as:  CYMBALTA     ELIQUIS 5 mg Tab  Generic drug:  apixaban     furosemide 20 MG tablet  Commonly known as:  LASIX  Take 1 tablet (20 mg total) by mouth once daily.     gabapentin 100 MG capsule  Commonly known as:  NEURONTIN  Take 1 capsule (100 mg total) by mouth 3 (three) times daily.     glimepiride 4 MG tablet  Commonly known as:  AMARYL     meloxicam 7.5 MG tablet  Commonly known as:  MOBIC     metFORMIN 500 MG tablet  Commonly known as:  GLUCOPHAGE     metoprolol succinate 50 MG 24 hr tablet  Commonly known as:  TOPROL-XL  Take 1 tablet (50 mg total) by mouth once daily.     nitroGLYCERIN 0.4 MG SL tablet  Commonly known as:  NITROSTAT  Place 1 tablet (0.4 mg total) under the tongue 3 (three) times daily as needed for Chest pain.     rOPINIRole 1 MG tablet  Commonly known as:  REQUIP  Take 2 tablets (2 mg total) by mouth 2 (two) times daily.     SITagliptin 50 MG Tab  Commonly known as:  JANUVIA        STOP taking these medications    clindamycin 300 MG capsule  Commonly known as:  CLEOCIN  Stopped by:  Tito Velazquez MD           Where to Get Your Medications      These medications were sent to [facility] - [address], [facility] [address]    Phone:  [phone]   · quinapril 10 MG tablet

## 2018-10-11 NOTE — PROGRESS NOTES
Patient ID by name and . Allergies verified. Influenza High Dose vaccine given IM in left deltoid using aseptic technique. Aspirated with no blood noted. Patient tolerated well. Given per physicians order. No adverse reaction noted.

## 2018-10-12 RX ORDER — INSULIN PUMP SYRINGE, 3 ML
EACH MISCELLANEOUS
Qty: 1 EACH | Refills: 0 | Status: SHIPPED | OUTPATIENT
Start: 2018-10-12 | End: 2019-08-15

## 2018-10-12 NOTE — TELEPHONE ENCOUNTER
----- Message from Ramona Deshpande sent at 10/12/2018 10:08 AM CDT -----  Contact: Feroz with [facility] phone [phone] ref# [id number] fax [phone]  Feroz with [facility] phone [phone] ref# [id number] fax [phone], Calling for an order for a One Touch Ultra Kit. Please advise. Thanks.

## 2018-10-18 ENCOUNTER — TELEPHONE (OUTPATIENT)
Dept: PRIMARY CARE CLINIC | Facility: CLINIC | Age: 83
End: 2018-10-18

## 2018-10-18 NOTE — TELEPHONE ENCOUNTER
I spoke to Kendal with home health. She says the pts BP was 80/40 and her pulse was 40. She has +3 pitting in her left for and about +2 in her right. No chest pain or tightness.     She also wanted to bring it up that the patient is not on potassium but is on lasix

## 2018-10-18 NOTE — TELEPHONE ENCOUNTER
patient needs to go to the ER per Dr Velazquez.     Patient notified and states understanding. She says she will get dressed and call a cab to come get her

## 2018-10-18 NOTE — TELEPHONE ENCOUNTER
----- Message from Aruna Mijares sent at 10/18/2018 12:10 PM CDT -----  Contact: home health nurse     Shila nurse from home health nurse need to speak to nurse regarding patient     Please call  [phone] Home health     80/40 Blood pressure     Call placed to POD and on call nurse

## 2018-10-22 ENCOUNTER — TELEPHONE (OUTPATIENT)
Dept: PRIMARY CARE CLINIC | Facility: CLINIC | Age: 83
End: 2018-10-22

## 2018-10-22 NOTE — TELEPHONE ENCOUNTER
----- Message from Kimmy Martinez sent at 10/22/2018  8:32 AM CDT -----  Contact: self  Patient [phone] is calling to speak with the Nurse today as she has a procedure with Dr uMnroe tomorrow 10 23 18 but has some questions for Dr Velazquez's nurse/please call

## 2018-10-22 NOTE — TELEPHONE ENCOUNTER
"Patient wanted to know if she needs labs or a chest x-ray done before her "procedure" with Dr. Munroe tomorrow. I told her I was unsure but for her to call Dr. Munroe's office. She states understanding   "

## 2018-10-23 PROBLEM — R55 SYNCOPE AND COLLAPSE: Status: ACTIVE | Noted: 2018-10-23

## 2018-10-25 DIAGNOSIS — E11.42 PERIPHERAL SENSORY NEUROPATHY DUE TO TYPE 2 DIABETES MELLITUS: ICD-10-CM

## 2018-10-25 RX ORDER — GABAPENTIN 100 MG/1
CAPSULE ORAL
Qty: 90 CAPSULE | Refills: 5 | Status: SHIPPED | OUTPATIENT
Start: 2018-10-25 | End: 2019-02-21 | Stop reason: SDUPTHER

## 2018-11-15 PROBLEM — R29.6 MULTIPLE FALLS: Status: ACTIVE | Noted: 2018-11-15

## 2018-11-15 PROBLEM — L03.90 CELLULITIS: Status: ACTIVE | Noted: 2018-11-15

## 2018-11-15 PROBLEM — E11.49 TYPE 2 DIABETES MELLITUS WITH NEUROLOGIC COMPLICATION: Status: ACTIVE | Noted: 2018-11-15

## 2018-11-15 PROBLEM — S09.90XA HEAD INJURY: Status: ACTIVE | Noted: 2018-11-15

## 2018-11-16 PROBLEM — S09.90XA HEAD INJURY: Status: RESOLVED | Noted: 2018-11-15 | Resolved: 2018-11-16

## 2018-12-03 ENCOUNTER — OFFICE VISIT (OUTPATIENT)
Dept: PRIMARY CARE CLINIC | Facility: CLINIC | Age: 83
End: 2018-12-03
Payer: MEDICARE

## 2018-12-03 VITALS
SYSTOLIC BLOOD PRESSURE: 126 MMHG | TEMPERATURE: 98 F | BODY MASS INDEX: 31.76 KG/M2 | HEART RATE: 91 BPM | OXYGEN SATURATION: 97 % | WEIGHT: 186 LBS | DIASTOLIC BLOOD PRESSURE: 74 MMHG | HEIGHT: 64 IN | RESPIRATION RATE: 16 BRPM

## 2018-12-03 DIAGNOSIS — I50.32 CHRONIC DIASTOLIC CHF (CONGESTIVE HEART FAILURE): ICD-10-CM

## 2018-12-03 DIAGNOSIS — E11.42 PERIPHERAL SENSORY NEUROPATHY DUE TO TYPE 2 DIABETES MELLITUS: ICD-10-CM

## 2018-12-03 DIAGNOSIS — Z23 NEED FOR VACCINATION: ICD-10-CM

## 2018-12-03 DIAGNOSIS — R29.6 FREQUENT FALLS: Primary | ICD-10-CM

## 2018-12-03 DIAGNOSIS — R53.1 GENERALIZED WEAKNESS: ICD-10-CM

## 2018-12-03 PROBLEM — L03.90 CELLULITIS: Status: RESOLVED | Noted: 2018-11-15 | Resolved: 2018-12-03

## 2018-12-03 PROBLEM — R09.02 HYPOXIA: Status: RESOLVED | Noted: 2018-09-25 | Resolved: 2018-12-03

## 2018-12-03 PROBLEM — M79.662 PAIN OF LEFT CALF: Status: RESOLVED | Noted: 2018-09-27 | Resolved: 2018-12-03

## 2018-12-03 PROBLEM — M25.552 HIP PAIN, ACUTE, LEFT: Status: RESOLVED | Noted: 2018-09-27 | Resolved: 2018-12-03

## 2018-12-03 PROBLEM — Z79.01 CHRONIC ANTICOAGULATION: Status: RESOLVED | Noted: 2018-08-03 | Resolved: 2018-12-03

## 2018-12-03 PROBLEM — R00.1 BRADYCARDIA: Status: RESOLVED | Noted: 2018-09-27 | Resolved: 2018-12-03

## 2018-12-03 PROBLEM — I16.0 HYPERTENSIVE URGENCY: Status: RESOLVED | Noted: 2018-09-18 | Resolved: 2018-12-03

## 2018-12-03 PROBLEM — L03.116 CELLULITIS OF LEFT LOWER LEG: Status: RESOLVED | Noted: 2018-09-17 | Resolved: 2018-12-03

## 2018-12-03 PROBLEM — I95.9 HYPOTENSION: Status: RESOLVED | Noted: 2018-09-25 | Resolved: 2018-12-03

## 2018-12-03 PROBLEM — R55 SYNCOPE AND COLLAPSE: Status: RESOLVED | Noted: 2018-10-23 | Resolved: 2018-12-03

## 2018-12-03 PROCEDURE — G0009 ADMIN PNEUMOCOCCAL VACCINE: HCPCS | Mod: 59,S$GLB,, | Performed by: FAMILY MEDICINE

## 2018-12-03 PROCEDURE — 99214 OFFICE O/P EST MOD 30 MIN: CPT | Mod: 25,S$GLB,, | Performed by: FAMILY MEDICINE

## 2018-12-03 PROCEDURE — 90471 IMMUNIZATION ADMIN: CPT | Mod: 59,S$GLB,, | Performed by: FAMILY MEDICINE

## 2018-12-03 PROCEDURE — 1101F PT FALLS ASSESS-DOCD LE1/YR: CPT | Mod: CPTII,S$GLB,, | Performed by: FAMILY MEDICINE

## 2018-12-03 PROCEDURE — 90714 TD VACC NO PRESV 7 YRS+ IM: CPT | Mod: S$GLB,,, | Performed by: FAMILY MEDICINE

## 2018-12-03 PROCEDURE — 99999 PR PBB SHADOW E&M-EST. PATIENT-LVL IV: CPT | Mod: PBBFAC,,, | Performed by: FAMILY MEDICINE

## 2018-12-03 PROCEDURE — 90732 PPSV23 VACC 2 YRS+ SUBQ/IM: CPT | Mod: S$GLB,,, | Performed by: FAMILY MEDICINE

## 2018-12-03 RX ORDER — ASPIRIN 81 MG/1
81 TABLET ORAL DAILY
COMMUNITY

## 2018-12-03 NOTE — PROGRESS NOTES
"Subjective:       Patient ID: Evita Cuevas is a 88 y.o. female.    Chief Complaint: Hospital Follow Up (Patient is here to follow up since going to the ED for falling )    Patient recently admitted to the hospital with another fall.  She fell back and hit the back of her head, no definite loss of consciousness.  She did sustain a scalp hematoma, head CT negative for any other acute abnormalities.  She has had progressively worsening gait difficulties over the past several months, having had multiple falls.  She uses her walker almost all the time, but says she has a hard time getting it into her bathroom in her house.  She lives alone, and has limited resources available for assistance.  She does not drive, and has to take taxis or get rides to go anywhere.  Has had home health in the past, and found to be beneficial.  No fall since her recent discharge      Review of Systems   Constitutional: Negative for fever.   HENT: Negative for trouble swallowing.    Eyes: Negative for visual disturbance.   Respiratory: Negative for shortness of breath.    Cardiovascular: Negative for chest pain.   Gastrointestinal: Negative for vomiting.   Endocrine: Negative for polydipsia and polyuria.   Genitourinary: Negative for difficulty urinating.   Musculoskeletal: Positive for gait problem.   Skin: Negative for wound.   Neurological: Positive for weakness and numbness. Negative for seizures.   Hematological: Bruises/bleeds easily.   Psychiatric/Behavioral: Negative for agitation and confusion.       Objective:      Vitals:    12/03/18 1501   BP: 126/74   BP Location: Right arm   Patient Position: Sitting   BP Method: Large (Automatic)   Pulse: 91   Resp: 16   Temp: 98.2 °F (36.8 °C)   TempSrc: Oral   SpO2: 97%   Weight: 84.4 kg (186 lb)   Height: 5' 4" (1.626 m)     Physical Exam   Constitutional: She is oriented to person, place, and time. She appears well-developed and well-nourished.   HENT:   Head: Normocephalic and " atraumatic.   Eyes: EOM are normal.   Neck: No JVD present.   Cardiovascular: Normal rate, regular rhythm and normal heart sounds.   Pulmonary/Chest: Effort normal and breath sounds normal.   Abdominal: Soft. She exhibits no distension. There is no tenderness.   Musculoskeletal: She exhibits edema (1+ bilaterally).   Neurological: She is alert and oriented to person, place, and time.   Skin: Skin is warm and dry.   Psychiatric: She has a normal mood and affect. Her behavior is normal.   Nursing note and vitals reviewed.      Assessment:       1. Frequent falls    2. Need for vaccination    3. 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 Generalized weakness    5. Chronic diastolic CHF (congestive heart failure)        Plan:       Frequent falls  -     Ambulatory referral to Home Health  Stressed importance of using her walker at all times.  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 If you have any questions, ask your nurse or doctor.               CONTINUE taking these medications    aspirin 81 MG EC tablet  Commonly known as:  ECOTRIN     atorvastatin 20 MG tablet  Commonly known as:  LIPITOR     blood-glucose meter kit  Check blood sugar once daily     brimonidine 0.1% 0.1 % Drop  Commonly known as:  ALPHAGAN P     carbidopa-levodopa  mg per disintegrating tablet  Commonly known as:  PARCOPA     DULoxetine 30 MG capsule  Commonly known as:  CYMBALTA     furosemide 20 MG tablet  Commonly known as:  LASIX  Take 1 tablet (20 mg total) by  mouth once daily.     gabapentin 100 MG capsule  Commonly known as:  NEURONTIN  TAKE 1 CAPSULE BY MOUTH THREE TIMES A DAY     glimepiride 4 MG tablet  Commonly known as:  AMARYL     meloxicam 7.5 MG tablet  Commonly known as:  MOBIC     metFORMIN 500 MG tablet  Commonly known as:  GLUCOPHAGE     metoprolol succinate 50 MG 24 hr tablet  Commonly known as:  TOPROL-XL  Take 1 tablet (50 mg total) by mouth once daily.     nitroGLYCERIN 0.4 MG SL tablet  Commonly known as:  NITROSTAT  Place 1 tablet (0.4 mg total) under the tongue 3 (three) times daily as needed for Chest pain.     rOPINIRole 1 MG tablet  Commonly known as:  REQUIP  Take 2 tablets (2 mg total) by mouth 2 (two) times daily.     spironolactone 25 MG tablet  Commonly known as:  ALDACTONE

## 2018-12-03 NOTE — PROGRESS NOTES
Patient verified by name and . Allergies reviewed. Pneumo 23 vaccine given im to left deltoid and Td vaccine given im to right deltoid, using aseptic technique. Patient tolerated well.

## 2018-12-13 ENCOUNTER — TELEPHONE (OUTPATIENT)
Dept: PRIMARY CARE CLINIC | Facility: CLINIC | Age: 83
End: 2018-12-13

## 2018-12-13 ENCOUNTER — NURSE TRIAGE (OUTPATIENT)
Dept: ADMINISTRATIVE | Facility: CLINIC | Age: 83
End: 2018-12-13

## 2018-12-13 RX ORDER — MECLIZINE HCL 12.5 MG 12.5 MG/1
12.5 TABLET ORAL 3 TIMES DAILY PRN
Qty: 30 TABLET | Refills: 2 | Status: SHIPPED | OUTPATIENT
Start: 2018-12-13 | End: 2018-12-14 | Stop reason: CLARIF

## 2018-12-13 NOTE — TELEPHONE ENCOUNTER
----- Message from Lynn Christiansonedis sent at 12/13/2018  3:42 PM CST -----  Contact: Self  Patient has been dizzy and keeps on falling and she is requesting that the doctor call in something for the dizziness.  She said that it is across her eyes, call back to advise at [phone] (home).  Thanks    [facility]/pharmacy #[id number] - [place] - [address]  [address] [place] [address]  Phone: [phone] Fax: [phone]

## 2018-12-13 NOTE — TELEPHONE ENCOUNTER
Patient says she is very dizzy. She checked her BP and heart rate three times- 156/82 76, 174/84 71, 168/63 68. She is asking if something can be called in for the dizziness? She says she is staying hydrated and feels fine other than the dizziness.

## 2018-12-14 ENCOUNTER — TELEPHONE (OUTPATIENT)
Dept: PRIMARY CARE CLINIC | Facility: CLINIC | Age: 83
End: 2018-12-14

## 2018-12-14 RX ORDER — DIAZEPAM 2 MG/1
2 TABLET ORAL EVERY 12 HOURS PRN
Qty: 30 TABLET | Refills: 0 | Status: SHIPPED | OUTPATIENT
Start: 2018-12-14

## 2018-12-14 NOTE — TELEPHONE ENCOUNTER
Meclizine needs a PA. It can take 48-72 hours for a response. Anything you can change the patient to instead?

## 2018-12-14 NOTE — TELEPHONE ENCOUNTER
Reason for Disposition   Caller has NON-URGENT medication question about med that PCP prescribed and triager unable to answer question    Protocols used: ST MEDICATION QUESTION CALL-A-SANDEEP    Evita will need prior auth for antivert to have it filled. She is asking for paperwork to be faxed to pharmacy on 12/14/18 if possible please. Please contact caller with any further care advice.

## 2018-12-26 ENCOUNTER — TELEPHONE (OUTPATIENT)
Dept: PRIMARY CARE CLINIC | Facility: CLINIC | Age: 83
End: 2018-12-26

## 2018-12-26 NOTE — TELEPHONE ENCOUNTER
----- Message from Jessie Groves sent at 12/26/2018  9:54 AM CST -----   Type: Needs Medical Advice  Who Called:  Fozia with  [facility] ins  Best Call Back Number [phone]  Ext [phone]  Additional Information:  Calling to see if the  Pt  Was  Approved  For  Home  Health   Or  Call pt at [phone]

## 2019-01-04 RX ORDER — SPIRONOLACTONE 25 MG/1
25 TABLET ORAL DAILY
Qty: 90 TABLET | Refills: 1 | Status: SHIPPED | OUTPATIENT
Start: 2019-01-04 | End: 2019-09-05 | Stop reason: SDUPTHER

## 2019-01-04 RX ORDER — METOPROLOL SUCCINATE 50 MG/1
50 TABLET, EXTENDED RELEASE ORAL DAILY
Qty: 90 TABLET | Refills: 1 | Status: ON HOLD | OUTPATIENT
Start: 2019-01-04 | End: 2019-03-22

## 2019-01-04 RX ORDER — FUROSEMIDE 20 MG/1
20 TABLET ORAL DAILY
Qty: 90 TABLET | Refills: 1 | Status: SHIPPED | OUTPATIENT
Start: 2019-01-04 | End: 2019-07-05 | Stop reason: SDUPTHER

## 2019-02-21 DIAGNOSIS — E11.42 PERIPHERAL SENSORY NEUROPATHY DUE TO TYPE 2 DIABETES MELLITUS: ICD-10-CM

## 2019-02-21 RX ORDER — GABAPENTIN 100 MG/1
CAPSULE ORAL
Qty: 90 CAPSULE | Refills: 5 | Status: SHIPPED | OUTPATIENT
Start: 2019-02-21

## 2019-03-04 ENCOUNTER — TELEPHONE (OUTPATIENT)
Dept: PRIMARY CARE CLINIC | Facility: CLINIC | Age: 84
End: 2019-03-04

## 2019-03-04 NOTE — TELEPHONE ENCOUNTER
----- Message from Evelyne Matson sent at 3/4/2019 11:23 AM CST -----  Contact: patient  Type: Needs Medical Advice    Who Called:  Patient  Symptoms (please be specific):    How long has patient had these symptoms:    Pharmacy name and phone #:    Best Call Back Number: [phone]  Additional Information: requesting a call back regarding sleeping issues

## 2019-03-20 ENCOUNTER — OFFICE VISIT (OUTPATIENT)
Dept: PRIMARY CARE CLINIC | Facility: CLINIC | Age: 84
End: 2019-03-20
Payer: MEDICARE

## 2019-03-20 VITALS
BODY MASS INDEX: 36.32 KG/M2 | HEIGHT: 63 IN | RESPIRATION RATE: 16 BRPM | HEART RATE: 44 BPM | TEMPERATURE: 98 F | SYSTOLIC BLOOD PRESSURE: 166 MMHG | WEIGHT: 205 LBS | DIASTOLIC BLOOD PRESSURE: 84 MMHG | OXYGEN SATURATION: 99 %

## 2019-03-20 DIAGNOSIS — E11.69 TYPE 2 DIABETES MELLITUS WITH HYPERLIPIDEMIA: ICD-10-CM

## 2019-03-20 DIAGNOSIS — L03.116 CELLULITIS OF LEFT LOWER LEG: ICD-10-CM

## 2019-03-20 DIAGNOSIS — E78.5 TYPE 2 DIABETES MELLITUS WITH HYPERLIPIDEMIA: ICD-10-CM

## 2019-03-20 DIAGNOSIS — R79.89 ELEVATED TROPONIN: ICD-10-CM

## 2019-03-20 DIAGNOSIS — E78.5 HYPERLIPIDEMIA, UNSPECIFIED HYPERLIPIDEMIA TYPE: ICD-10-CM

## 2019-03-20 DIAGNOSIS — I50.33 ACUTE ON CHRONIC DIASTOLIC HEART FAILURE: Primary | ICD-10-CM

## 2019-03-20 PROBLEM — I27.20 PULMONARY HYPERTENSION: Status: ACTIVE | Noted: 2019-03-20

## 2019-03-20 PROCEDURE — 99999 PR PBB SHADOW E&M-EST. PATIENT-LVL IV: ICD-10-PCS | Mod: PBBFAC,,, | Performed by: FAMILY MEDICINE

## 2019-03-20 PROCEDURE — 93000 ELECTROCARDIOGRAM COMPLETE: CPT | Mod: S$GLB,,, | Performed by: INTERNAL MEDICINE

## 2019-03-20 PROCEDURE — 99215 OFFICE O/P EST HI 40 MIN: CPT | Mod: S$GLB,,, | Performed by: FAMILY MEDICINE

## 2019-03-20 PROCEDURE — 1101F PR PT FALLS ASSESS DOC 0-1 FALLS W/OUT INJ PAST YR: ICD-10-PCS | Mod: CPTII,S$GLB,, | Performed by: FAMILY MEDICINE

## 2019-03-20 PROCEDURE — 99215 PR OFFICE/OUTPT VISIT, EST, LEVL V, 40-54 MIN: ICD-10-PCS | Mod: S$GLB,,, | Performed by: FAMILY MEDICINE

## 2019-03-20 PROCEDURE — 1101F PT FALLS ASSESS-DOCD LE1/YR: CPT | Mod: CPTII,S$GLB,, | Performed by: FAMILY MEDICINE

## 2019-03-20 PROCEDURE — 93000 EKG 12-LEAD: ICD-10-PCS | Mod: S$GLB,,, | Performed by: INTERNAL MEDICINE

## 2019-03-20 PROCEDURE — 99999 PR PBB SHADOW E&M-EST. PATIENT-LVL IV: CPT | Mod: PBBFAC,,, | Performed by: FAMILY MEDICINE

## 2019-03-20 RX ORDER — MECLIZINE HCL 12.5 MG 12.5 MG/1
TABLET ORAL
Refills: 2 | COMMUNITY
Start: 2019-01-18 | End: 2019-04-05

## 2019-03-20 RX ORDER — FLUTICASONE PROPIONATE 50 MCG
1 SPRAY, SUSPENSION (ML) NASAL DAILY
COMMUNITY

## 2019-03-20 NOTE — PROGRESS NOTES
"Subjective:       Patient ID: Evita Cuevas is a 88 y.o. female.    Chief Complaint: Edema (patient wants a foot and leg exam) and Shortness of Breath    Patient complains of progressively worsening lower extremity swelling over the last few weeks and shortness of breath, primarily with exertion.  Denies chest pain or palpitations.  Has had about a 20 lb weight gain since she was last seen in December.  Also complains of increasing redness and discomfort to her left lower leg.  No open sores or wounds.      Review of Systems   Constitutional: Positive for fatigue. Negative for fever.   HENT: Negative for trouble swallowing.    Eyes: Negative for visual disturbance.   Respiratory: Positive for shortness of breath. Negative for cough and wheezing.    Cardiovascular: Positive for leg swelling. Negative for chest pain and palpitations.   Gastrointestinal: Negative for blood in stool.   Genitourinary: Negative for difficulty urinating.   Musculoskeletal: Positive for arthralgias and gait problem.   Skin: Positive for color change.   Neurological: Positive for weakness.   Hematological: Bruises/bleeds easily.   Psychiatric/Behavioral: Negative for agitation and confusion.       Objective:      Vitals:    03/20/19 1153   BP: (!) 166/84   BP Location: Left arm   Patient Position: Sitting   BP Method: Medium (Automatic)   Pulse: (!) 44   Resp: 16   Temp: 97.9 °F (36.6 °C)   TempSrc: Oral   SpO2: 99%   Weight: 93 kg (205 lb)   Height: 5' 3" (1.6 m)     Lab Results   Component Value Date    WBC 7.30 03/20/2019    HGB 12.5 03/20/2019    HCT 38.7 03/20/2019     03/20/2019    CHOL 139 03/20/2019    TRIG 75 03/20/2019    HDL 49 03/20/2019    ALT 50 03/20/2019    AST 53 (H) 03/20/2019     03/20/2019    K 3.6 03/20/2019     03/20/2019    CREATININE 0.7 03/20/2019    BUN 18 03/20/2019    CO2 23 03/20/2019    TSH 1.14 11/16/2018    INR 1.0 10/18/2018    HGBA1C 7.3 (H) 11/16/2018     Physical Exam "   Constitutional: She is oriented to person, place, and time. She appears well-developed and well-nourished.   HENT:   Head: Normocephalic and atraumatic.   Eyes: EOM are normal.   Neck: No JVD present.   Cardiovascular: Regular rhythm. Bradycardia present.   Murmur heard.   Systolic murmur is present.  Pulmonary/Chest: Effort normal and breath sounds normal.   Abdominal: Soft. She exhibits no distension. There is no tenderness.   Musculoskeletal: She exhibits edema (2+ on right to knee, 3+ LLE).   Neurological: She is alert and oriented to person, place, and time.   Skin: Skin is warm and dry. There is erythema.   Erythema, warmth and tenderness to left lower leg   Psychiatric: She has a normal mood and affect. Her behavior is normal.   Nursing note and vitals reviewed.      Assessment:       1. Acute on chronic diastolic heart failure    2. Cellulitis of left lower leg    3. Type 2 diabetes mellitus with hyperlipidemia    4. Hyperlipidemia, unspecified hyperlipidemia type    5. Elevated troponin        Plan:       Acute on chronic diastolic heart failure  -     X-Ray Chest PA And Lateral; Future; Expected date: 03/20/2019  -     EKG 12-lead  Chest x-ray no acute abnormalities, no acute ST segment changes on EKG.  However, BNP elevated and troponin minimally elevated at 0.09.  Will send patient to ER for further workup and likely admission.  Needs cardiology evaluation, IV antibiotics for left lower extremity cellulitis.  Cellulitis of left lower leg  -     CBC auto differential; Future; Expected date: 03/20/2019  -     Comprehensive metabolic panel; Future; Expected date: 03/20/2019  -     Troponin I; Future; Expected date: 03/20/2019  -     Brain natriuretic peptide; Future; Expected date: 03/20/2019  -     Ambulatory Referral to Cardiology    Type 2 diabetes mellitus with hyperlipidemia  -     Hemoglobin A1c; Future; Expected date: 03/20/2019  -     Microalbumin/creatinine urine ratio; Future; Expected date:  03/20/2019    Hyperlipidemia, unspecified hyperlipidemia type  -     Lipid panel; Future; Expected date: 03/20/2019    Elevated troponin      Medication List with Changes/Refills   Current Medications    ASPIRIN (ECOTRIN) 81 MG EC TABLET    Take 81 mg by mouth once daily.    ATORVASTATIN (LIPITOR) 20 MG TABLET    Take 20 mg by mouth once daily.    BLOOD-GLUCOSE METER KIT    Check blood sugar once daily    BRIMONIDINE 0.1% (ALPHAGAN P) 0.1 % DROP    Place 1 drop into both eyes 3 (three) times daily.    CARBIDOPA-LEVODOPA (PARCOPA)  MG PER DISINTEGRATING TABLET    Take 1 tablet by mouth 3 (three) times daily.    DIAZEPAM (VALIUM) 2 MG TABLET    Take 1 tablet (2 mg total) by mouth every 12 (twelve) hours as needed (vertigo).    DULOXETINE (CYMBALTA) 30 MG CAPSULE    Take 30 mg by mouth once daily.    FLUTICASONE (FLONASE) 50 MCG/ACTUATION NASAL SPRAY    1 spray by Each Nare route once daily.    FUROSEMIDE (LASIX) 20 MG TABLET    Take 1 tablet (20 mg total) by mouth once daily.    GABAPENTIN (NEURONTIN) 100 MG CAPSULE    TAKE 1 CAPSULE BY MOUTH THREE TIMES A DAY    GLIMEPIRIDE (AMARYL) 4 MG TABLET    Take 4 mg by mouth before breakfast.    MECLIZINE (ANTIVERT) 12.5 MG TABLET    TAKE 1 TABLET (12.5 MG TOTAL) BY MOUTH 3 (THREE) TIMES DAILY AS NEEDED FOR DIZZINESS.    MELOXICAM (MOBIC) 7.5 MG TABLET    Take 7.5 mg by mouth once daily.    METFORMIN (GLUCOPHAGE) 500 MG TABLET    Take 500 mg by mouth 2 (two) times daily with meals.    METOPROLOL SUCCINATE (TOPROL-XL) 50 MG 24 HR TABLET    Take 1 tablet (50 mg total) by mouth once daily.    NITROGLYCERIN (NITROSTAT) 0.4 MG SL TABLET    Place 1 tablet (0.4 mg total) under the tongue 3 (three) times daily as needed for Chest pain.    ROPINIROLE (REQUIP) 1 MG TABLET    Take 2 tablets (2 mg total) by mouth 2 (two) times daily.    SPIRONOLACTONE (ALDACTONE) 25 MG TABLET    Take 1 tablet (25 mg total) by mouth once daily.

## 2019-03-22 PROBLEM — I44.2 AV BLOCK, COMPLETE: Status: ACTIVE | Noted: 2019-03-22

## 2019-04-03 ENCOUNTER — TELEPHONE (OUTPATIENT)
Dept: PRIMARY CARE CLINIC | Facility: CLINIC | Age: 84
End: 2019-04-03

## 2019-04-03 NOTE — TELEPHONE ENCOUNTER
----- Message from Med Majano sent at 4/3/2019 11:04 AM CDT -----  Contact: Perla with [facility]  Type: Needs Medical Advice    Who Called:  Perla    Best Call Back Number: [phone]  Additional Information: pt has gained 11 lbs in 4 days.  Please call to advise.

## 2019-04-03 NOTE — TELEPHONE ENCOUNTER
Perla states the patient has had an 11lb weight gain in 4 days but she is not weighing herself at the same time every day. She has some edema but it's not putting. The patient is not alarmed at all and is taking medications properly. She has an appointment on Friday

## 2019-04-05 ENCOUNTER — OFFICE VISIT (OUTPATIENT)
Dept: PRIMARY CARE CLINIC | Facility: CLINIC | Age: 84
End: 2019-04-05
Payer: MEDICARE

## 2019-04-05 VITALS
RESPIRATION RATE: 16 BRPM | BODY MASS INDEX: 34.55 KG/M2 | HEIGHT: 63 IN | WEIGHT: 195 LBS | HEART RATE: 59 BPM | DIASTOLIC BLOOD PRESSURE: 71 MMHG | OXYGEN SATURATION: 98 % | SYSTOLIC BLOOD PRESSURE: 112 MMHG | TEMPERATURE: 98 F

## 2019-04-05 DIAGNOSIS — I87.2 STASIS DERMATITIS OF BOTH LEGS: ICD-10-CM

## 2019-04-05 DIAGNOSIS — I44.2 AV BLOCK, COMPLETE: Primary | ICD-10-CM

## 2019-04-05 DIAGNOSIS — Z95.0 PACEMAKER: ICD-10-CM

## 2019-04-05 DIAGNOSIS — R60.0 BILATERAL LEG EDEMA: ICD-10-CM

## 2019-04-05 PROBLEM — L03.116 CELLULITIS OF LEFT LEG: Status: RESOLVED | Noted: 2019-03-20 | Resolved: 2019-04-05

## 2019-04-05 PROBLEM — I25.110 ATHEROSCLEROSIS OF NATIVE CORONARY ARTERY OF NATIVE HEART WITH UNSTABLE ANGINA PECTORIS: Status: ACTIVE | Noted: 2019-04-05

## 2019-04-05 PROCEDURE — 99214 PR OFFICE/OUTPT VISIT, EST, LEVL IV, 30-39 MIN: ICD-10-PCS | Mod: S$GLB,,, | Performed by: FAMILY MEDICINE

## 2019-04-05 PROCEDURE — 99999 PR PBB SHADOW E&M-EST. PATIENT-LVL III: ICD-10-PCS | Mod: PBBFAC,,, | Performed by: FAMILY MEDICINE

## 2019-04-05 PROCEDURE — 99999 PR PBB SHADOW E&M-EST. PATIENT-LVL III: CPT | Mod: PBBFAC,,, | Performed by: FAMILY MEDICINE

## 2019-04-05 PROCEDURE — 99214 OFFICE O/P EST MOD 30 MIN: CPT | Mod: S$GLB,,, | Performed by: FAMILY MEDICINE

## 2019-04-05 NOTE — PROGRESS NOTES
"Subjective:       Patient ID: Evita Cuevas is a 88 y.o. female.    Chief Complaint: Hospital Follow Up    Admitted to [facility] with CHF and BLE cellulitis. While hospitalized, was found to be in complete heart block, had pacemaker placed by Dr. Munroe (has f/u with him later today). Since discharge, has been staying with her sister in [place], but planning on moving back into her own home. Feeling "110% better" since hospitalization, energy level much better. Dizzy spells have completely resolved. No CP, palpitations or SoB. Ambulating with walker, no recent fall. Sleeping well, eating OK. Overall down 10 pounds since she was admitted. Legs still swelling, but denies pain. Has compression sticking, but only using intermittently.    Review of Systems   Constitutional: Negative for fever.   HENT: Negative for trouble swallowing.    Eyes: Negative for visual disturbance.   Respiratory: Negative for shortness of breath.    Cardiovascular: Positive for leg swelling. Negative for chest pain.   Gastrointestinal: Negative for nausea and vomiting.   Genitourinary: Negative for difficulty urinating.   Musculoskeletal: Positive for arthralgias and gait problem.   Skin: Negative for wound.   Neurological: Negative for dizziness and syncope.   Hematological: Bruises/bleeds easily.   Psychiatric/Behavioral: Negative for agitation and confusion.       Objective:      Vitals:    04/05/19 1107   BP: 112/71   BP Location: Right arm   Patient Position: Sitting   BP Method: Large (Automatic)   Pulse: (!) 59   Resp: 16   Temp: 97.8 °F (36.6 °C)   TempSrc: Oral   SpO2: 98%   Weight: 88.5 kg (195 lb)   Height: 5' 3" (1.6 m)     Physical Exam   Constitutional: She is oriented to person, place, and time. She appears well-developed and well-nourished.   HENT:   Head: Normocephalic and atraumatic.   Neck: No JVD present.   Cardiovascular: Normal rate, regular rhythm and normal heart sounds.   Pulmonary/Chest: Effort normal and breath " sounds normal.       Musculoskeletal: She exhibits edema (2+ bilaterally with stasis dermatitis).   Neurological: She is alert and oriented to person, place, and time.   Skin: Skin is warm and dry.   Psychiatric: She has a normal mood and affect. Her behavior is normal.   Nursing note and vitals reviewed.      Assessment:       1. AV block, complete    2. Pacemaker    3. Stasis dermatitis of both legs    4. Bilateral leg edema        Plan:       AV block, complete  Symptomatically much better since pacemaker. F/U with Dr. Munroe as scheduled later today  Pacemaker    Stasis dermatitis of both legs  Stressed importance of elevating legs as much as possible, and wear compression stockings as much as possible  Bilateral leg edema  As above    Medication List with Changes/Refills   Current Medications    AMLODIPINE (NORVASC) 10 MG TABLET    Take 1 tablet (10 mg total) by mouth once daily.    ASPIRIN (ECOTRIN) 81 MG EC TABLET    Take 81 mg by mouth once daily.    ATORVASTATIN (LIPITOR) 20 MG TABLET    Take 20 mg by mouth once daily.    BLOOD-GLUCOSE METER KIT    Check blood sugar once daily    BRIMONIDINE 0.1% (ALPHAGAN P) 0.1 % DROP    Place 1 drop into both eyes 3 (three) times daily.    CARBIDOPA-LEVODOPA (PARCOPA)  MG PER DISINTEGRATING TABLET    Take 1 tablet by mouth 3 (three) times daily.    CEPHALEXIN (KEFLEX) 500 MG CAPSULE    Take 1 capsule (500 mg total) by mouth every 12 (twelve) hours.    DIAZEPAM (VALIUM) 2 MG TABLET    Take 1 tablet (2 mg total) by mouth every 12 (twelve) hours as needed (vertigo).    DULOXETINE (CYMBALTA) 30 MG CAPSULE    Take 30 mg by mouth 3 (three) times daily.     FLUTICASONE (FLONASE) 50 MCG/ACTUATION NASAL SPRAY    1 spray by Each Nare route once daily.    FUROSEMIDE (LASIX) 20 MG TABLET    Take 1 tablet (20 mg total) by mouth once daily.    GABAPENTIN (NEURONTIN) 100 MG CAPSULE    TAKE 1 CAPSULE BY MOUTH THREE TIMES A DAY    ISOSORBIDE MONONITRATE (IMDUR) 30 MG 24 HR TABLET     Take 1 tablet (30 mg total) by mouth once daily.    MELOXICAM (MOBIC) 7.5 MG TABLET    Take 7.5 mg by mouth once daily.    METFORMIN (GLUCOPHAGE) 500 MG TABLET    Take 500 mg by mouth 2 (two) times daily with meals.    METOPROLOL TARTRATE (LOPRESSOR) 50 MG TABLET    Take 1 tablet (50 mg total) by mouth 2 (two) times daily.    NITROGLYCERIN (NITROSTAT) 0.4 MG SL TABLET    Place 1 tablet (0.4 mg total) under the tongue 3 (three) times daily as needed for Chest pain.    ROPINIROLE (REQUIP) 1 MG TABLET    Take 2 tablets (2 mg total) by mouth 2 (two) times daily.    SPIRONOLACTONE (ALDACTONE) 25 MG TABLET    Take 1 tablet (25 mg total) by mouth once daily.   Discontinued Medications    GLIMEPIRIDE (AMARYL) 4 MG TABLET    Take 4 mg by mouth before breakfast.    MECLIZINE (ANTIVERT) 12.5 MG TABLET    TAKE 1 TABLET (12.5 MG TOTAL) BY MOUTH 3 (THREE) TIMES DAILY AS NEEDED FOR DIZZINESS.

## 2019-04-08 ENCOUNTER — TELEPHONE (OUTPATIENT)
Dept: ADMINISTRATIVE | Facility: CLINIC | Age: 84
End: 2019-04-08

## 2019-04-08 NOTE — TELEPHONE ENCOUNTER
Home Health SOC 03/30/2019 - 05/28/2019 with [facility] ([facility]) - Dr. Isaac Eller. Patient received SN, PT and OT services.

## 2019-06-04 DIAGNOSIS — E78.5 TYPE 2 DIABETES MELLITUS WITH HYPERLIPIDEMIA: Primary | ICD-10-CM

## 2019-06-04 DIAGNOSIS — E11.69 TYPE 2 DIABETES MELLITUS WITH HYPERLIPIDEMIA: Primary | ICD-10-CM

## 2019-06-04 NOTE — TELEPHONE ENCOUNTER
----- Message from Ramona Deshpande sent at 6/4/2019  4:14 PM CDT -----  Contact: Da with [facility] phone [phone] Ref# [id number]  Da with [facility] phone [phone] Ref# [id number], Calling to talk to about a prescription for One Touch test strips Ultrablue. Please call him. Thanks.

## 2019-06-18 ENCOUNTER — TELEPHONE (OUTPATIENT)
Dept: PRIMARY CARE CLINIC | Facility: CLINIC | Age: 84
End: 2019-06-18

## 2019-06-18 NOTE — TELEPHONE ENCOUNTER
----- Message from Nena Ellis sent at 6/18/2019  2:40 PM CDT -----  Type: Needs Medical Advice    Who Called:  Teresa with [facility] Call Back Number: [phone] ext [phone]  Additional Information: Patient needs home health assistance with setting up medications/please call patient back to advise.

## 2019-06-20 ENCOUNTER — PATIENT OUTREACH (OUTPATIENT)
Dept: ADMINISTRATIVE | Facility: HOSPITAL | Age: 84
End: 2019-06-20

## 2019-06-21 ENCOUNTER — TELEPHONE (OUTPATIENT)
Dept: PRIMARY CARE CLINIC | Facility: CLINIC | Age: 84
End: 2019-06-21

## 2019-06-21 NOTE — TELEPHONE ENCOUNTER
----- Message from Ramona Deshpande sent at 6/21/2019 10:29 AM CDT -----  Contact: Teresa with [facility] phone  [phone] ext [phone]  Type:  Patient Returning Call    Who Called:  Teresa with [facility]  Who Left Message for Patient:  Alec  Does the patient know what this is regarding?:  Home Health  Best Call Back Number:   [phone] ext [phone]  Additional Information:  Missed your call, please call lher back. Thanks.

## 2019-06-21 NOTE — TELEPHONE ENCOUNTER
Tried calling the ext listed in the message twice but it keeps going to a Vincentian speaking line.

## 2019-06-28 ENCOUNTER — TELEPHONE (OUTPATIENT)
Dept: PRIMARY CARE CLINIC | Facility: CLINIC | Age: 84
End: 2019-06-28

## 2019-06-28 NOTE — TELEPHONE ENCOUNTER
----- Message from Gracie Lopez sent at 6/28/2019  2:46 PM CDT -----  Contact: ricardo barrett/ [facility] [phone] ext [phone]  ricardo barrett/ [facility] [phone] ext [phone]  Returning phone call

## 2019-07-05 ENCOUNTER — CLINICAL SUPPORT (OUTPATIENT)
Dept: PRIMARY CARE CLINIC | Facility: CLINIC | Age: 84
End: 2019-07-05
Payer: MEDICARE

## 2019-07-05 ENCOUNTER — OFFICE VISIT (OUTPATIENT)
Dept: PRIMARY CARE CLINIC | Facility: CLINIC | Age: 84
End: 2019-07-05
Payer: MEDICARE

## 2019-07-05 VITALS
WEIGHT: 188 LBS | HEIGHT: 63 IN | DIASTOLIC BLOOD PRESSURE: 71 MMHG | RESPIRATION RATE: 18 BRPM | TEMPERATURE: 98 F | BODY MASS INDEX: 33.31 KG/M2 | OXYGEN SATURATION: 95 % | SYSTOLIC BLOOD PRESSURE: 124 MMHG | HEART RATE: 83 BPM

## 2019-07-05 DIAGNOSIS — G20.A1 PARKINSON DISEASE: ICD-10-CM

## 2019-07-05 DIAGNOSIS — F33.0 MILD EPISODE OF RECURRENT MAJOR DEPRESSIVE DISORDER: Primary | ICD-10-CM

## 2019-07-05 DIAGNOSIS — E78.5 TYPE 2 DIABETES MELLITUS WITH HYPERLIPIDEMIA: ICD-10-CM

## 2019-07-05 DIAGNOSIS — E11.69 TYPE 2 DIABETES MELLITUS WITH HYPERLIPIDEMIA: ICD-10-CM

## 2019-07-05 DIAGNOSIS — Z91.148 NONCOMPLIANCE W/MEDICATION TREATMENT DUE TO INTERMIT USE OF MEDICATION: ICD-10-CM

## 2019-07-05 DIAGNOSIS — Z79.899 MEDICATION MANAGEMENT: ICD-10-CM

## 2019-07-05 PROBLEM — R53.1 GENERALIZED WEAKNESS: Status: RESOLVED | Noted: 2018-09-26 | Resolved: 2019-07-05

## 2019-07-05 PROBLEM — R29.6 MULTIPLE FALLS: Status: RESOLVED | Noted: 2018-11-15 | Resolved: 2019-07-05

## 2019-07-05 PROBLEM — R55 NEAR SYNCOPE: Status: RESOLVED | Noted: 2018-09-26 | Resolved: 2019-07-05

## 2019-07-05 PROBLEM — R29.6 FREQUENT FALLS: Status: RESOLVED | Noted: 2018-08-03 | Resolved: 2019-07-05

## 2019-07-05 LAB
ANION GAP SERPL CALC-SCNC: 9 MMOL/L (ref 8–16)
BUN SERPL-MCNC: 19 MG/DL (ref 8–23)
CALCIUM SERPL-MCNC: 9.3 MG/DL (ref 8.6–10)
CHLORIDE SERPL-SCNC: 104 MMOL/L (ref 101–111)
CO2 SERPL-SCNC: 26 MMOL/L (ref 23–29)
CREAT SERPL-MCNC: 0.6 MG/DL (ref 0.5–1.4)
EST. GFR  (AFRICAN AMERICAN): >60 ML/MIN/1.73 M^2
EST. GFR  (NON AFRICAN AMERICAN): >60 ML/MIN/1.73 M^2
ESTIMATED AVG GLUCOSE: 194 MG/DL (ref 68–131)
GLUCOSE SERPL-MCNC: 139 MG/DL (ref 74–118)
HBA1C MFR BLD HPLC: 8.4 % (ref 4–5.6)
POTASSIUM SERPL-SCNC: 4 MMOL/L (ref 3.5–5.1)
SODIUM SERPL-SCNC: 139 MMOL/L (ref 136–145)

## 2019-07-05 PROCEDURE — 36415 COLL VENOUS BLD VENIPUNCTURE: CPT | Mod: S$GLB,,, | Performed by: FAMILY MEDICINE

## 2019-07-05 PROCEDURE — 1101F PR PT FALLS ASSESS DOC 0-1 FALLS W/OUT INJ PAST YR: ICD-10-PCS | Mod: CPTII,S$GLB,, | Performed by: FAMILY MEDICINE

## 2019-07-05 PROCEDURE — 99214 OFFICE O/P EST MOD 30 MIN: CPT | Mod: S$GLB,,, | Performed by: FAMILY MEDICINE

## 2019-07-05 PROCEDURE — 80048 BASIC METABOLIC PNL TOTAL CA: CPT

## 2019-07-05 PROCEDURE — 99999 PR PBB SHADOW E&M-EST. PATIENT-LVL III: ICD-10-PCS | Mod: PBBFAC,,, | Performed by: FAMILY MEDICINE

## 2019-07-05 PROCEDURE — 1101F PT FALLS ASSESS-DOCD LE1/YR: CPT | Mod: CPTII,S$GLB,, | Performed by: FAMILY MEDICINE

## 2019-07-05 PROCEDURE — 36415 PR COLLECTION VENOUS BLOOD,VENIPUNCTURE: ICD-10-PCS | Mod: S$GLB,,, | Performed by: FAMILY MEDICINE

## 2019-07-05 PROCEDURE — 99999 PR PBB SHADOW E&M-EST. PATIENT-LVL III: CPT | Mod: PBBFAC,,, | Performed by: FAMILY MEDICINE

## 2019-07-05 PROCEDURE — 99214 PR OFFICE/OUTPT VISIT, EST, LEVL IV, 30-39 MIN: ICD-10-PCS | Mod: S$GLB,,, | Performed by: FAMILY MEDICINE

## 2019-07-05 PROCEDURE — 83036 HEMOGLOBIN GLYCOSYLATED A1C: CPT

## 2019-07-05 RX ORDER — CITALOPRAM 10 MG/1
10 TABLET ORAL DAILY
Qty: 90 TABLET | Refills: 1 | Status: SHIPPED | OUTPATIENT
Start: 2019-07-05

## 2019-07-05 RX ORDER — METOPROLOL SUCCINATE 50 MG/1
TABLET, EXTENDED RELEASE ORAL
Qty: 90 TABLET | Refills: 1 | OUTPATIENT
Start: 2019-07-05

## 2019-07-05 RX ORDER — FUROSEMIDE 20 MG/1
TABLET ORAL
Qty: 90 TABLET | Refills: 1 | Status: SHIPPED | OUTPATIENT
Start: 2019-07-05

## 2019-07-05 NOTE — PROGRESS NOTES
"Subjective:       Patient ID: Evita Cuevas is a 89 y.o. female.    Chief Complaint: Follow-up (here for a 3 month follow up)    Here for routine checkup. Has been feeling depressed lately, largely centered around strained relationship with her sons. Her oldest son  almost 4 years ago. Of her 2 remaining sons, she says "one is good to me," the other "not so much." Says the "good" son is currently dealing with health issues, as well. Feels she doesn't "get from them what I give to them," and this includes grandchildren and great grandchildren.  Says she doesn't feel "sick," but does sometimes forget to take her medication from time to time. Lives alone, unable to drive (hasn't driven in ~4 years)    Review of Systems   Constitutional: Positive for fatigue. Negative for chills and fever.   HENT: Negative for congestion.    Eyes: Negative for visual disturbance.   Respiratory: Negative for cough and shortness of breath.    Cardiovascular: Positive for leg swelling. Negative for chest pain.   Gastrointestinal: Negative for abdominal pain, nausea and vomiting.   Genitourinary: Negative for difficulty urinating.   Musculoskeletal: Negative for arthralgias.   Skin: Negative for rash.   Neurological: Negative for dizziness.   Psychiatric/Behavioral: Positive for dysphoric mood. Negative for agitation, self-injury and suicidal ideas. The patient is nervous/anxious.        Objective:      Vitals:    19 1112   BP: 124/71   BP Location: Left arm   Patient Position: Sitting   BP Method: Medium (Automatic)   Pulse: 83   Resp: 18   Temp: 98.4 °F (36.9 °C)   TempSrc: Oral   SpO2: 95%   Weight: 85.3 kg (188 lb)   Height: 5' 3" (1.6 m)      Lab Results   Component Value Date    WBC 7.70 2019    HGB 12.2 2019    HCT 37.6 2019     2019    CHOL 139 2019    TRIG 75 2019    HDL 49 2019    ALT 9 (L) 2019    AST 37 2019     2019    K 3.7 2019    " CL 95 (L) 03/29/2019    CREATININE 0.7 03/29/2019    BUN 16 03/29/2019    CO2 30 (H) 03/29/2019    TSH 1.14 11/16/2018    INR 1.0 03/22/2019    HGBA1C 8.2 (H) 03/20/2019     Physical Exam   Constitutional: She is oriented to person, place, and time. She appears well-developed and well-nourished.   HENT:   Head: Normocephalic and atraumatic.   Neck: No JVD present.   Cardiovascular: Normal rate, regular rhythm and normal heart sounds.   Pulmonary/Chest: Effort normal and breath sounds normal.   Musculoskeletal: She exhibits edema (2+).   Neurological: She is alert and oriented to person, place, and time.   Skin: Skin is warm and dry.   Psychiatric: Her behavior is normal. Judgment and thought content normal. She exhibits a depressed mood.   Nursing note and vitals reviewed.      Assessment:       1. Mild episode of recurrent major depressive disorder    2. Parkinson disease    3. Medication management    4. Type 2 diabetes mellitus with hyperlipidemia    5. Noncompliance w/medication treatment due to intermit use of medication        Plan:       Mild episode of recurrent major depressive disorder  -     citalopram (CELEXA) 10 MG tablet; Take 1 tablet (10 mg total) by mouth once daily.  Dispense: 90 tablet; Refill: 1    Parkinson disease  -     Ambulatory referral to Home Health    Medication management  -     Ambulatory referral to Home Health    Type 2 diabetes mellitus with hyperlipidemia  -     Basic metabolic panel; Future; Expected date: 07/05/2019  -     Hemoglobin A1c; Future; Expected date: 07/05/2019    Noncompliance w/medication treatment due to intermit use of medication  -     Ambulatory referral to Home Health      Medication List with Changes/Refills   New Medications    CITALOPRAM (CELEXA) 10 MG TABLET    Take 1 tablet (10 mg total) by mouth once daily.   Current Medications    AMLODIPINE (NORVASC) 10 MG TABLET    Take 1 tablet (10 mg total) by mouth once daily.    ASPIRIN (ECOTRIN) 81 MG EC TABLET     Take 81 mg by mouth once daily.    ATORVASTATIN (LIPITOR) 20 MG TABLET    Take 20 mg by mouth once daily.    BLOOD SUGAR DIAGNOSTIC STRP    1 each by Misc.(Non-Drug; Combo Route) route once daily.    BLOOD-GLUCOSE METER KIT    Check blood sugar once daily    BRIMONIDINE 0.1% (ALPHAGAN P) 0.1 % DROP    Place 1 drop into both eyes 3 (three) times daily.    CARBIDOPA-LEVODOPA (PARCOPA)  MG PER DISINTEGRATING TABLET    Take 1 tablet by mouth 3 (three) times daily.    DIAZEPAM (VALIUM) 2 MG TABLET    Take 1 tablet (2 mg total) by mouth every 12 (twelve) hours as needed (vertigo).    FLUTICASONE (FLONASE) 50 MCG/ACTUATION NASAL SPRAY    1 spray by Each Nare route once daily.    FUROSEMIDE (LASIX) 20 MG TABLET    Take 1 tablet (20 mg total) by mouth once daily.    GABAPENTIN (NEURONTIN) 100 MG CAPSULE    TAKE 1 CAPSULE BY MOUTH THREE TIMES A DAY    ISOSORBIDE MONONITRATE (IMDUR) 30 MG 24 HR TABLET    Take 1 tablet (30 mg total) by mouth once daily.    MELOXICAM (MOBIC) 7.5 MG TABLET    Take 7.5 mg by mouth once daily.    METFORMIN (GLUCOPHAGE) 500 MG TABLET    Take 500 mg by mouth 2 (two) times daily with meals.    METOPROLOL TARTRATE (LOPRESSOR) 50 MG TABLET    Take 1 tablet (50 mg total) by mouth 2 (two) times daily.    NITROGLYCERIN (NITROSTAT) 0.4 MG SL TABLET    Place 1 tablet (0.4 mg total) under the tongue every 5 (five) minutes as needed for Chest pain.    ROPINIROLE (REQUIP) 1 MG TABLET    Take 2 tablets (2 mg total) by mouth 2 (two) times daily.    SPIRONOLACTONE (ALDACTONE) 25 MG TABLET    Take 1 tablet (25 mg total) by mouth once daily.    TRIAMCINOLONE ACETONIDE 0.1% (KENALOG) 0.1 % OINTMENT    Apply topically 2 (two) times daily.   Discontinued Medications    CEPHALEXIN (KEFLEX) 500 MG CAPSULE    Take 1 capsule (500 mg total) by mouth every 12 (twelve) hours.    DULOXETINE (CYMBALTA) 30 MG CAPSULE    Take 30 mg by mouth 3 (three) times daily.

## 2019-07-14 DIAGNOSIS — E78.5 TYPE 2 DIABETES MELLITUS WITH HYPERLIPIDEMIA: Primary | ICD-10-CM

## 2019-07-14 DIAGNOSIS — E11.69 TYPE 2 DIABETES MELLITUS WITH HYPERLIPIDEMIA: Primary | ICD-10-CM

## 2019-07-17 PROCEDURE — G0180 PR HOME HEALTH MD CERTIFICATION: ICD-10-PCS | Mod: ,,, | Performed by: FAMILY MEDICINE

## 2019-07-17 PROCEDURE — G0180 MD CERTIFICATION HHA PATIENT: HCPCS | Mod: ,,, | Performed by: FAMILY MEDICINE

## 2019-07-29 ENCOUNTER — TELEPHONE (OUTPATIENT)
Dept: PRIMARY CARE CLINIC | Facility: CLINIC | Age: 84
End: 2019-07-29

## 2019-07-31 ENCOUNTER — EXTERNAL HOME HEALTH (OUTPATIENT)
Dept: HOME HEALTH SERVICES | Facility: HOSPITAL | Age: 84
End: 2019-07-31
Payer: MEDICARE

## 2019-08-14 NOTE — PROGRESS NOTES
CC:   Chief Complaint   Patient presents with    Diabetes Mellitus     type 2        HPI: Evita Cuevas is a 89 y.o. female presents for an initial visit today for the management of T2DM. She was previously being followed  By Dr. León     She was diagnosed with Type 2 diabetes in her 70's on routine labs and started on Metformin. She has never been on insulin therapy.   We called her pharmacy and she was previously on Januvia- but has been off of it since 10/2018.   She met with diabetes education today prior to this visit.     Family hx of diabetes: denies   Hospitalized for diabetes: denies   No personal or FH of thyroid cancer or personal of pancreatic cancer or pancreatitis.     She falls frequently at home and does have some memory issues.   She no longer drives  - she took a cab here today.     Post prandial 1 hour   Results for orders placed or performed in visit on 08/15/19   POCT Glucose, Hand-Held Device   Result Value Ref Range    POC Glucose 202 (A) 70 - 110 MG/DL       DIABETES COMPLICATIONS: peripheral neuropathy and cardiovascular disease      Diabetes Management Status    ASA:  Yes - 81 mg     Statin: Taking  ACE/ARB: Taking     Screening or Prevention Patient's value Goal Complete/Controlled?   HgA1C Testing and Control   Lab Results   Component Value Date    HGBA1C 8.4 (H) 07/05/2019      Annually/Less than 8% No   Lipid profile : 03/20/2019 Annually Yes   LDL control Lab Results   Component Value Date    LDLCALC 75 03/20/2019    Annually/Less than 100 mg/dl  Yes   Nephropathy screening Lab Results   Component Value Date    LABMICR 600.0 03/20/2019     Lab Results   Component Value Date    PROTEINUA Negative 03/20/2019    Annually Yes   Blood pressure BP Readings from Last 1 Encounters:   08/15/19 (!) 102/50    Less than 140/90 Yes   Dilated retinal exam : 04/18/2019 Annually Yes   Foot exam   : 08/15/2019 Annually No       CURRENT A1C:    Hemoglobin A1C   Date Value Ref Range Status    07/05/2019 8.4 (H) 4.0 - 5.6 % Final     Comment:     ADA Screening Guidelines:  5.7-6.4%  Consistent with prediabetes  >or=6.5%  Consistent with diabetes  High levels of fetal hemoglobin interfere with the HbA1C  assay. Heterozygous hemoglobin variants (HbS, HgC, etc)do  not significantly interfere with this assay.   However, presence of multiple variants may affect accuracy.     03/20/2019 8.2 (H) 4.0 - 5.6 % Final     Comment:     ADA Screening Guidelines:  5.7-6.4%  Consistent with prediabetes  >or=6.5%  Consistent with diabetes  High levels of fetal hemoglobin interfere with the HbA1C  assay. Heterozygous hemoglobin variants (HbS, HgC, etc)do  not significantly interfere with this assay.   However, presence of multiple variants may affect accuracy.     11/16/2018 7.3 (H) 4.0 - 5.6 % Final     Comment:     ADA Screening Guidelines:  5.7-6.4%  Consistent with prediabetes  >or=6.5%  Consistent with diabetes  High levels of fetal hemoglobin interfere with the HbA1C  assay. Heterozygous hemoglobin variants (HbS, HgC, etc)do  not significantly interfere with this assay.   However, presence of multiple variants may affect accuracy.         GOAL A1C: 7.5%-8 % without hypoglycemia     DM MEDICATIONS USED IN THE PAST: Metformin   Januvia??? Off unsure why     CURRENT DIABETES MEDICATIONS: Metformin 500 mg BID   Insulin: N/A  Missed doses: occ- she may miss 2-3 times in a month     BLOOD GLUCOSE MONITORING:  She is not consistently checking her blood sugar   No logs or meter to clinic today for review.   She  hs BG yesterday and it was 59?  Her mete is at least 3 years old     HYPOGLYCEMIA:  No often? She reports her BG was 59? Unsure if accurate. She did not have symptoms.     MEALS: eating 2 meals per day   Snack: ~ peanut butter on a slice of toast   Drinks: ~ diet root beer or lemonade and ice tea with Splenda   Not much water      CURRENT EXERCISE:  No    Review of Systems  Review of Systems    Constitutional: Negative for appetite change, fatigue and unexpected weight change.   HENT: Negative for trouble swallowing.    Eyes: Negative for visual disturbance.   Respiratory: Negative for shortness of breath.    Cardiovascular: Positive for leg swelling (bilateral ). Negative for chest pain.   Gastrointestinal: Negative for nausea.   Endocrine: Negative for polydipsia, polyphagia and polyuria.   Genitourinary:        No Nocturia    Musculoskeletal: Positive for gait problem (rolling walker ).   Skin: Negative for wound.   Neurological: Negative for numbness.        + memory loss.        Physical Exam   Physical Exam   Constitutional: She is oriented to person, place, and time. She appears well-developed and well-nourished.   Obese female patient    HENT:   Head: Normocephalic and atraumatic.   Right Ear: External ear normal.   Left Ear: External ear normal.   Nose: Nose normal.   Neck: Normal range of motion. Neck supple. No tracheal deviation present. No thyromegaly present.   Cardiovascular: Normal rate and regular rhythm.   No murmur heard.  +1-2 pitting BLE edema    Pulmonary/Chest: Effort normal and breath sounds normal. No respiratory distress.   Abdominal: Soft. There is no tenderness. No hernia.   Musculoskeletal: She exhibits no edema.   Neurological: She is alert and oriented to person, place, and time. No cranial nerve deficit.   Skin: Skin is warm and dry. Capillary refill takes less than 2 seconds. No rash noted.   Psychiatric: She has a normal mood and affect. Her behavior is normal. Judgment normal.   Nursing note and vitals reviewed.      FOOT EXAMINATION:  Appropriate footwear.     Protective Sensation (w/ 10 gram monofilament):  Right: Decreased  Left: Decreased    Visual Inspection:  Normal -  Bilateral, Nails Intact - without Evidence of Foot Deformity- Bilateral, Callus -  Bilateral and Onychomycosis -  Bilateral    Pedal Pulses:   Right: Present  Left: Present    Posterior tibialis:    Right:Present  Left: Present        Lab Results   Component Value Date    TSH 1.14 11/16/2018         Type 2 diabetes mellitus with hyperglycemia, without long-term current use of insulin  Uncontrolled   A1c above goal.   + dietary indiscretions.     Medication changes:   Increase Metformin to 1000 mg BID   Start Januvia 100 mg daily     Avoiding PEOPLES- given her frequent falls. Goal is to prevent hypoglycemia.     -- Reviewed goals of therapy are to get the best control we can without hypoglycemia  -- Advised frequent self blood glucose monitoring.  Patient encouraged to document glucose results and bring them to every clinic visit  -- at least BID at alternating times. Send logs PRN. RX sent for new meter   -- Hypoglycemia precautions discussed. Instructed on precautions before driving.    -- Call for Bg repeatedly < 90 or > 180.   -- Close adherence to lifestyle changes recommended.   -- Periodic follow ups for eye evaluations, foot care and dental care suggested.          Type 2 diabetes mellitus with diabetic polyneuropathy, without long-term current use of insulin  Optimize BG readings.   See above.   Previously following with podiatry. Deferred a referral to [facility]     Educated patient to check feet daily for any foreign objects and/or wounds. Discussed with patient the importance of wearing appropriate footwear at all times, not to walk barefoot ever, and to check shoes before putting them on feet. Instructed patient to keep feet dry by regularly changing shoes and socks and drying feet after baths and exercises. Also, instructed patient to report any new lesions, discolorations, or swelling to a healthcare professional.        Essential hypertension, benign  BP goal is < 140/90.   Tolerating ACEi   Controlled   Blood pressure goals discussed with patient      Hyperlipemia  On statin per ADA recommendations  LDL goal < 100. LDL at goal. LFTs WNL. Continue statin.     Vitamin D deficiency  Vit D,  25-Hydroxy   Date Value Ref Range Status   11/16/2018 22 (L) 30 - 96 ng/mL Final     Comment:     Vitamin D deficiency.........<10 ng/mL                              Vitamin D insufficiency......10-29 ng/mL       Vitamin D sufficiency........> or equal to 30 ng/mL  Vitamin D toxicity............>100 ng/mL         Start vitamin D 2000 iu daily     Obesity (BMI 30-39.9)  Body mass index is 33.3 kg/m².  Increases insulin resistance.   Discussed DM diet and exercise.     Noncompliance w/medication treatment due to intermit use of medication  Hindering diabetes management   Referral to outpatient case management for assistance         Follow up in about 3 months (around 11/15/2019).   Labs prior       Orders Placed This Encounter   Procedures    Microalbumin/creatinine urine ratio     Standing Status:   Future     Standing Expiration Date:   2/15/2021     Order Specific Question:   Specimen Source     Answer:   Urine    Hemoglobin A1c     Standing Status:   Future     Standing Expiration Date:   2/15/2021    Comprehensive metabolic panel     Standing Status:   Future     Standing Expiration Date:   2/15/2021    TSH     Standing Status:   Future     Standing Expiration Date:   2/15/2021    Vitamin D     Standing Status:   Future     Standing Expiration Date:   2/15/2021    Lipid panel     Standing Status:   Future     Standing Expiration Date:   2/15/2021    Ambulatory referral to Outpatient Case Management     Referral Priority:   Routine     Referral Type:   Consultation     Referral Reason:   Specialty Services Required     Number of Visits Requested:   1    POCT Glucose, Hand-Held Device       Recommendations were discussed with the patient in detail  The patient verbalized understanding and agrees with the plan outlined as above.

## 2019-08-15 ENCOUNTER — OFFICE VISIT (OUTPATIENT)
Dept: DIABETES | Facility: CLINIC | Age: 84
End: 2019-08-15
Payer: MEDICARE

## 2019-08-15 ENCOUNTER — CLINICAL SUPPORT (OUTPATIENT)
Dept: DIABETES | Facility: CLINIC | Age: 84
End: 2019-08-15
Payer: MEDICARE

## 2019-08-15 VITALS
SYSTOLIC BLOOD PRESSURE: 102 MMHG | DIASTOLIC BLOOD PRESSURE: 50 MMHG | HEART RATE: 70 BPM | BODY MASS INDEX: 33.3 KG/M2 | HEIGHT: 63 IN

## 2019-08-15 VITALS — WEIGHT: 188 LBS | HEIGHT: 63 IN | BODY MASS INDEX: 33.31 KG/M2

## 2019-08-15 DIAGNOSIS — E66.9 OBESITY (BMI 30-39.9): ICD-10-CM

## 2019-08-15 DIAGNOSIS — E11.69 TYPE 2 DIABETES MELLITUS WITH HYPERLIPIDEMIA: Primary | ICD-10-CM

## 2019-08-15 DIAGNOSIS — E11.65 TYPE 2 DIABETES MELLITUS WITH HYPERGLYCEMIA, WITHOUT LONG-TERM CURRENT USE OF INSULIN: ICD-10-CM

## 2019-08-15 DIAGNOSIS — Z91.148 NONCOMPLIANCE W/MEDICATION TREATMENT DUE TO INTERMIT USE OF MEDICATION: ICD-10-CM

## 2019-08-15 DIAGNOSIS — E78.5 TYPE 2 DIABETES MELLITUS WITH HYPERLIPIDEMIA: Primary | ICD-10-CM

## 2019-08-15 DIAGNOSIS — E11.42 TYPE 2 DIABETES MELLITUS WITH DIABETIC POLYNEUROPATHY, WITHOUT LONG-TERM CURRENT USE OF INSULIN: ICD-10-CM

## 2019-08-15 DIAGNOSIS — E11.65 TYPE 2 DIABETES MELLITUS WITH HYPERGLYCEMIA, WITHOUT LONG-TERM CURRENT USE OF INSULIN: Primary | ICD-10-CM

## 2019-08-15 DIAGNOSIS — R29.6 FREQUENT FALLS: ICD-10-CM

## 2019-08-15 DIAGNOSIS — E11.40 TYPE 2 DIABETES MELLITUS WITH DIABETIC NEUROPATHY, WITHOUT LONG-TERM CURRENT USE OF INSULIN: ICD-10-CM

## 2019-08-15 DIAGNOSIS — E78.2 MIXED HYPERLIPIDEMIA: ICD-10-CM

## 2019-08-15 DIAGNOSIS — E55.9 VITAMIN D DEFICIENCY: ICD-10-CM

## 2019-08-15 DIAGNOSIS — I10 ESSENTIAL HYPERTENSION, BENIGN: ICD-10-CM

## 2019-08-15 LAB — GLUCOSE SERPL-MCNC: 202 MG/DL (ref 70–110)

## 2019-08-15 PROCEDURE — 99999 PR PBB SHADOW E&M-EST. PATIENT-LVL III: CPT | Mod: PBBFAC,,, | Performed by: DIETITIAN, REGISTERED

## 2019-08-15 PROCEDURE — 99999 PR PBB SHADOW E&M-EST. PATIENT-LVL III: CPT | Mod: PBBFAC,,, | Performed by: NURSE PRACTITIONER

## 2019-08-15 PROCEDURE — 99214 PR OFFICE/OUTPT VISIT, EST, LEVL IV, 30-39 MIN: ICD-10-PCS | Mod: S$GLB,,, | Performed by: NURSE PRACTITIONER

## 2019-08-15 PROCEDURE — 82962 POCT GLUCOSE, HAND-HELD DEVICE: ICD-10-PCS | Mod: S$GLB,,, | Performed by: NURSE PRACTITIONER

## 2019-08-15 PROCEDURE — 1101F PR PT FALLS ASSESS DOC 0-1 FALLS W/OUT INJ PAST YR: ICD-10-PCS | Mod: CPTII,S$GLB,, | Performed by: NURSE PRACTITIONER

## 2019-08-15 PROCEDURE — 99214 OFFICE O/P EST MOD 30 MIN: CPT | Mod: S$GLB,,, | Performed by: NURSE PRACTITIONER

## 2019-08-15 PROCEDURE — G0108 DIAB MANAGE TRN  PER INDIV: HCPCS | Mod: S$GLB,,, | Performed by: DIETITIAN, REGISTERED

## 2019-08-15 PROCEDURE — G0108 PR DIAB MANAGE TRN  PER INDIV: ICD-10-PCS | Mod: S$GLB,,, | Performed by: DIETITIAN, REGISTERED

## 2019-08-15 PROCEDURE — 82962 GLUCOSE BLOOD TEST: CPT | Mod: S$GLB,,, | Performed by: NURSE PRACTITIONER

## 2019-08-15 PROCEDURE — 99999 PR PBB SHADOW E&M-EST. PATIENT-LVL III: ICD-10-PCS | Mod: PBBFAC,,, | Performed by: NURSE PRACTITIONER

## 2019-08-15 PROCEDURE — 1101F PT FALLS ASSESS-DOCD LE1/YR: CPT | Mod: CPTII,S$GLB,, | Performed by: NURSE PRACTITIONER

## 2019-08-15 PROCEDURE — 99999 PR PBB SHADOW E&M-EST. PATIENT-LVL III: ICD-10-PCS | Mod: PBBFAC,,, | Performed by: DIETITIAN, REGISTERED

## 2019-08-15 RX ORDER — LANCETS
EACH MISCELLANEOUS
Qty: 200 EACH | Refills: 3 | Status: SHIPPED | OUTPATIENT
Start: 2019-08-15

## 2019-08-15 RX ORDER — VIT C/E/ZN/COPPR/LUTEIN/ZEAXAN 250MG-90MG
2000 CAPSULE ORAL DAILY
Qty: 180 CAPSULE | Refills: 2 | Status: SHIPPED | OUTPATIENT
Start: 2019-08-15 | End: 2019-11-13

## 2019-08-15 RX ORDER — INSULIN PUMP SYRINGE, 3 ML
EACH MISCELLANEOUS
Qty: 1 EACH | Refills: 0 | Status: SHIPPED | OUTPATIENT
Start: 2019-08-15

## 2019-08-15 RX ORDER — METFORMIN HYDROCHLORIDE 1000 MG/1
1000 TABLET ORAL 2 TIMES DAILY WITH MEALS
Qty: 180 TABLET | Refills: 1 | Status: SHIPPED | OUTPATIENT
Start: 2019-08-15 | End: 2020-08-14

## 2019-08-15 NOTE — ASSESSMENT & PLAN NOTE
Optimize BG readings.   See above.   Previously following with podiatry. Deferred a referral to [facility]     Educated patient to check feet daily for any foreign objects and/or wounds. Discussed with patient the importance of wearing appropriate footwear at all times, not to walk barefoot ever, and to check shoes before putting them on feet. Instructed patient to keep feet dry by regularly changing shoes and socks and drying feet after baths and exercises. Also, instructed patient to report any new lesions, discolorations, or swelling to a healthcare professional.

## 2019-08-15 NOTE — LETTER
August 15, 2019      Tito Velazquez MD  [address]  [address]  [place] [address]           [facility]  [address], [facility] [address]  [place] [address]  Phone: [phone]  Fax: [phone]          Patient: Evita Cuevas   MR Number: [id number]   YOB: 1930   Date of Visit: 8/15/2019       Dear Dr. Tito Velazquez:    Thank you for referring Evita Cuevas to me for evaluation. Attached you will find relevant portions of my assessment and plan of care.    If you have questions, please do not hesitate to call me. I look forward to following Evita Cuevas along with you.    Sincerely,    Brianna Johnson NP    Enclosure  CC:  No Recipients    If you would like to receive this communication electronically, please contact [email or web address] or [phone] to request more information on [employer] access.    For providers and/or their staff who would like to refer a patient to [facility], please contact us through our one-stop-shop provider referral line, [facility], at [phone].    If you feel you have received this communication in error or would no longer like to receive these types of communications, please e-mail [email or web address]

## 2019-08-15 NOTE — ASSESSMENT & PLAN NOTE
BP goal is < 140/90.   Tolerating ACEi   Controlled   Blood pressure goals discussed with patient

## 2019-08-15 NOTE — ASSESSMENT & PLAN NOTE
Vit D, 25-Hydroxy   Date Value Ref Range Status   11/16/2018 22 (L) 30 - 96 ng/mL Final     Comment:     Vitamin D deficiency.........<10 ng/mL                              Vitamin D insufficiency......10-29 ng/mL       Vitamin D sufficiency........> or equal to 30 ng/mL  Vitamin D toxicity............>100 ng/mL         Start vitamin D 2000 iu daily

## 2019-08-15 NOTE — PATIENT INSTRUCTIONS
NEW METER AND SUPPLIES SENT IN.   START CHECKING BLOOD SUGAR 2 TIMES PER DAY AT ALTERNATING TIMES.       Increase Metformin to 1000 mg 2 times per day -- in the meantime for your 500 mg Tablets take 2 tablets in the AM with breakfast and 2 tablets in the PM with dinner until you run out- of your 500 mg tablets of  Your Metformin.   Start Januvia 100 mg daily     Start vitamin d - 2,000 iu daily             Snacks can be an important part of a balanced, healthy meal plan. They allow you to eat more frequently, feeling full and satisfied throughout the day. Also, they allow you to spread carbohydrates evenly, which may stabilize blood sugars.  Plus, snacks are enjoyable!     The amount of carbohydrate needed at snacks varies. Generally, about 15 grams of carbohydrate per snack is recommended.  Below you will find some tasty treats.       0-5 gm carb   Crystal Light   Vitamin Water Zero   Herbal tea, unsweetened   2 tsp peanut butter on celery   1./2 cup sugar-free jell-o   1 sugar-free popsicle   ¼ cup blueberries   8oz Blue Manju unsweetened almond milk   5 baby carrots & celery sticks, cucumbers, bell peppers dipped in ¼ cup salsa, 2Tbsp light ranch dressing or 2Tbsp plain Greek yogurt   10 Goldfish crackers   ½ oz low-fat cheese or string cheese   1 closed handful of nuts, unsalted   1 Tbsp of sunflower seeds, unsalted   1 cup Smart Pop popcorn   1 whole grain brown rice cake        15 gm carb   1 small piece of fruit or ½ banana or 1/2 cup lite canned fruit   3 kelvin cracker squares   3 cups Smart Pop popcorn, top spray butter, Juan lite salt or cinnamon and Truvia   5 Vanilla Wafers   ½ cup low fat, no added sugar ice cream or frozen yogurt (Blue bell, Blue Bunny, Weight Watchers, Skinny Cow)   ½ turkey, ham, or chicken sandwich   ½ c fruit with ½ c Cottage cheese   4-6 unsalted wheat crackers with 1 oz low fat cheese or 1 tbsp peanut butter    30-45 goldfish crackers (depending on  flavor)    7-8 Yarsani mini brown rice cakes (caramel, apple cinnamon, chocolate)    12 Yarsani mini brown rice cakes (cheddar, bbq, ranch)    1/3 cup hummus dip with raw veg   1/2 whole wheat gene, 1Tbsp hummus   Mini Pizza (1/2 whole wheat English muffin, low-fat  cheese, tomato sauce)   100 calorie snack pack (Oreo, Chips Ahoy, Ritz Mix, Baked Cheetos)   4-6 oz. light or Greek Style yogurt (Chobani, Yoplait, Okios, [place]field)   ½ cup sugar-free pudding     6 in. wheat tortilla or gene oven toasted chips (topped with spray butter flavoring, cinnamon, Truvia OR spray butter, garlic powder, chili powder)    18 BBQ Popchips (available at [employer], [employer], Fresh Market)

## 2019-08-16 NOTE — PROGRESS NOTES
Diabetes Education  Author: Zoë Daugherty RD  Date: 8/16/2019    Diabetes Care Management Summary  Diabetes Education Record Assessment/Progress: Initial  Current Diabetes Risk Level: Moderate     Last A1c:   Lab Results   Component Value Date    HGBA1C 8.4 (H) 07/05/2019     Last visit with Diabetes Educator: Last Education Visit: Not Found      Diabetes Type  Diabetes Type : Type II    Diabetes History  Diabetes Diagnosis: >10 years  Current Treatment: Oral Medication(metformin)  Reviewed Problem List with Patient: Yes    Health Maintenance was reviewed today with patient. Discussed with patient importance of routine eye exams, foot exams/foot care, blood work (i.e.: A1c, microalbumin, and lipid), dental visits, yearly flu vaccine, and pneumonia vaccine as indicated by PCP. Patient verbalized understanding.     Health Maintenance Topics with due status: Not Due       Topic Last Completion Date    TETANUS VACCINE 12/03/2018    Lipid Panel 03/20/2019    Eye Exam 04/18/2019    Hemoglobin A1c 07/05/2019    Foot Exam 08/15/2019     There are no preventive care reminders to display for this patient.    Nutrition  Meal Planning: skipping meals, snacks between meal, eats out seldom  What type of sweetener do you use?: Splenda  What type of beverages do you drink?: diet soda/tea, water  Meal Plan 24 Hour Recall - Breakfast: 1/2 slice bread and peanut butter  Meal Plan 24 Hour Recall - Lunch: skipped  Meal Plan 24 Hour Recall - Dinner: frozen dinner called dinner in a bowl  Meal Plan 24 Hour Recall - Snack: cookies, 1/2 small [employer] Frosty with strawberries    Monitoring   Monitoring: One Touch Verio IQ  Self Monitoring : once a day  Blood Glucose Logs: No  Do you use a personal continuous glucose monitor?: No  In the last month, how often have you had a low blood sugar reaction?: once  What are your symptoms of low blood sugar?: weak, has fallen several times - but didn't associate it with a hypoglycemic event, blood  sugar yesterday was 59 and patient was asymptomatic  How do you treat low blood sugar?: was not treating, instructed to drink juice   Can you tell when your blood sugar is too high?: no  How do you treat high blood sugar?: metformin BID    Exercise   Exercise Type: none(has physical therapy ordered)  Frequency: Never    Current Diabetes Treatment   Current Treatment: Oral Medication(metformin)    Social History  Preferred Learning Method: Face to Face  Primary Support: Self, Son, Family  Educational Level: High School  Occupation: retired - worked for a printing company  Smoking Status: Never a Smoker  Alcohol Use: Never            DDS-2 Score  ( > 3 = SIGNIFICANT DISTRESS): 1                   Barriers to Change  Barriers to Change: None  Learning Challenges : None    Readiness to Learn   Readiness to Learn : Acceptance    Cultural Influences  Cultural Influences: No    Diabetes Education Assessment/Progress  Diabetes Disease Process (diabetes disease process and treatment options): Comprehends Key Points, Requires Assistance Family/SO, Discussion, Instructed, Individual Session, Written Materials Provided  Nutrition (Incorporating nutritional management into one's lifestyle): Comprehends Key Points, Requires Assistance Family/SO, Discussion, Instructed, Individual Session, Written Materials Provided  Physical Activity (incorporating physical activity into one's lifestyle): Comprehends Key Points, Requires Assistance Family/SO, Discussion, Instructed, Individual Session, Written Materials Provided  Medications (states correct name, dose, onset, peak, duration, side effects & timing of meds): Comprehends Key Points, Requires Assistance Family/SO, Discussion, Instructed, Individual Session, Written Materials Provided  Monitoring (monitoring blood glucose/other parameters & using results): Written Materials Provided, Discussion, Individual Session, Instructed, Comprehends Key Points  Acute Complications (preventing,  detecting, and treating acute complications): Written Materials Provided, Discussion, Instructed, Individual Session, Comprehends Key Points  Chronic Complications (preventing, detecting, and treating chronic complications): Written Materials Provided, Discussion, Instructed, Individual Session, Comprehends Key Points  Clinical (diabetes, other pertinent medical history, and relevant comorbidities reviewed during visit): Written Materials Provided, Discussion, Instructed, Individual Session, Comprehends Key Points  Cognitive (knowledge of self-management skills, functional health literacy): Comprehends Key Points, Individual Session, Instructed, Discussion, Written Materials Provided  Psychosocial (emotional response to diabetes): Comprehends Key Points, Individual Session, Instructed, Discussion, Written Materials Provided  Diabetes Distress and Support Systems: Comprehends Key Points, Individual Session, Instructed, Discussion, Written Materials Provided  Behavioral (readiness for change, lifestyle practices, self-care behaviors): Comprehends Key Points, Individual Session, Instructed, Discussion, Written Materials Provided  Patient educated on what is DM, T1DM, T2DM, risk factors, managing DM, DM diet, carbohydrate counting, meal planning, reading a food label, healthy snack options, benefits of physical activity, diabetes care schedule, foot care guidelines, diabetes and retinopathy screening, s/s hypo and hyperglycemia, long/short term complication of uncontrolled DM, importance of compliance with treatment plan, how to use a glucometer, reviewed understanding diabetes distress, medications for treating DM, their mechanism of action and possible side effects, reviewed current level and goal level for HgbA1c, blood glucose, microalbumin, and lipids. Patient provided with written literature, diabetes management resources and support, DM Management program contact information.    Goals  Patient has  selected/evaluated goals during today's session: No         Diabetes Care Plan/Intervention  Education Plan/Intervention: Individual Follow-Up DSMT, Support Group for Diabetes Distress    Diabetes Meal Plan  Restrictions: Low Fat, Low Sodium, Restricted Carbohydrate  Calories: 1500  Carbohydrate Per Meal: 20-30g  Carbohydrate Per Snack : 7-15g  Fat: 50  Protein: 135    Today's Self-Management Care Plan was developed with the patient's input and is based on barriers identified during today's assessment.    The long and short-term goals in the care plan were written with the patient/caregiver's input. The patient has agreed to work toward these goals to improve her overall diabetes control.      The patient received a copy of today's self-management plan and verbalized understanding of the care plan, goals, and all of today's instructions.      The patient was encouraged to communicate with her physician and care team regarding her condition(s) and treatment.  I provided the patient with my contact information today and encouraged her to contact me via phone or patient portal as needed.     Education Units of Time   Time Spent: 60 min

## 2019-08-26 ENCOUNTER — TELEPHONE (OUTPATIENT)
Dept: HOME HEALTH SERVICES | Facility: HOSPITAL | Age: 84
End: 2019-08-26

## 2019-09-05 ENCOUNTER — EXTERNAL HOME HEALTH (OUTPATIENT)
Dept: HOME HEALTH SERVICES | Facility: HOSPITAL | Age: 84
End: 2019-09-05
Payer: MEDICARE

## 2019-09-05 RX ORDER — SPIRONOLACTONE 25 MG/1
TABLET ORAL
Qty: 90 TABLET | Refills: 1 | Status: SHIPPED | OUTPATIENT
Start: 2019-09-05

## 2019-09-13 ENCOUNTER — SSC ENCOUNTER (OUTPATIENT)
Dept: ADMINISTRATIVE | Facility: OTHER | Age: 84
End: 2019-09-13

## 2019-09-13 NOTE — PROGRESS NOTES
Please note the following patient's information has been forwarded to [facility] for case management or .    Please contact Outpatient Care Management with any questions (ext. [phone])    Thank you,    Yesica Reece, [facility]  Outpatient Case Mgmnt  [phone]

## 2019-09-15 PROCEDURE — G0179 MD RECERTIFICATION HHA PT: HCPCS | Mod: ,,, | Performed by: FAMILY MEDICINE

## 2019-09-15 PROCEDURE — G0179 PR HOME HEALTH MD RECERTIFICATION: ICD-10-PCS | Mod: ,,, | Performed by: FAMILY MEDICINE

## 2019-09-23 ENCOUNTER — EXTERNAL HOME HEALTH (OUTPATIENT)
Dept: HOME HEALTH SERVICES | Facility: HOSPITAL | Age: 84
End: 2019-09-23
Payer: MEDICARE

## 2019-09-23 NOTE — TELEPHONE ENCOUNTER
Please schedule the patient too Brianna and Zoë to discuss uncontrolled A1C. I tried to schedule it and it will not let me.     
Pt called and was able to schedule her appointments for 08/15  
VM was left for the pt with contacts and call back numbers to schedule an appointment with Mason   
no

## 2019-09-25 ENCOUNTER — TELEPHONE (OUTPATIENT)
Dept: PRIMARY CARE CLINIC | Facility: CLINIC | Age: 84
End: 2019-09-25

## 2019-09-25 RX ORDER — TRAMADOL HYDROCHLORIDE 50 MG/1
50 TABLET ORAL EVERY 8 HOURS PRN
Qty: 20 TABLET | Refills: 0 | Status: SHIPPED | OUTPATIENT
Start: 2019-09-25

## 2019-09-25 NOTE — TELEPHONE ENCOUNTER
Prescription for tramadol sent to the pharmacy.  Needs to make appointment to be seen or go to the emergency room for any new or worsening symptoms

## 2019-09-25 NOTE — TELEPHONE ENCOUNTER
----- Message from Katie Tyler sent at 9/25/2019  9:35 AM CDT -----  Contact: Patient [phone]  Patient had a fall on Sunday 09/22/2019 and went to the ER. Stated that she is in bad pain in her neck and tailbone and is requesting a Rx for pain.    [id number] - [place], [place] - [address]    Please call and advise.    Thank You

## 2019-09-27 ENCOUNTER — TELEPHONE (OUTPATIENT)
Dept: HOME HEALTH SERVICES | Facility: HOSPITAL | Age: 84
End: 2019-09-27

## 2019-09-30 ENCOUNTER — OFFICE VISIT (OUTPATIENT)
Dept: PRIMARY CARE CLINIC | Facility: CLINIC | Age: 84
End: 2019-09-30
Payer: MEDICARE

## 2019-09-30 VITALS
DIASTOLIC BLOOD PRESSURE: 68 MMHG | HEART RATE: 70 BPM | WEIGHT: 178.31 LBS | SYSTOLIC BLOOD PRESSURE: 132 MMHG | OXYGEN SATURATION: 95 % | BODY MASS INDEX: 31.59 KG/M2 | RESPIRATION RATE: 16 BRPM | TEMPERATURE: 99 F | HEIGHT: 63 IN

## 2019-09-30 DIAGNOSIS — S01.01XD SCALP LACERATION, SUBSEQUENT ENCOUNTER: Primary | ICD-10-CM

## 2019-09-30 DIAGNOSIS — L03.116 LEFT LEG CELLULITIS: ICD-10-CM

## 2019-09-30 DIAGNOSIS — Z23 NEED FOR VACCINATION: ICD-10-CM

## 2019-09-30 DIAGNOSIS — L97.921 SKIN ULCER OF LEFT LOWER LEG, LIMITED TO BREAKDOWN OF SKIN: ICD-10-CM

## 2019-09-30 PROCEDURE — 99999 PR PBB SHADOW E&M-EST. PATIENT-LVL III: CPT | Mod: PBBFAC,,, | Performed by: FAMILY MEDICINE

## 2019-09-30 PROCEDURE — 1101F PR PT FALLS ASSESS DOC 0-1 FALLS W/OUT INJ PAST YR: ICD-10-PCS | Mod: CPTII,S$GLB,, | Performed by: FAMILY MEDICINE

## 2019-09-30 PROCEDURE — G0008 FLU VACCINE - HIGH DOSE (65+) PRESERVATIVE FREE IM: ICD-10-PCS | Mod: S$GLB,,, | Performed by: FAMILY MEDICINE

## 2019-09-30 PROCEDURE — 1101F PT FALLS ASSESS-DOCD LE1/YR: CPT | Mod: CPTII,S$GLB,, | Performed by: FAMILY MEDICINE

## 2019-09-30 PROCEDURE — 90662 FLU VACCINE - HIGH DOSE (65+) PRESERVATIVE FREE IM: ICD-10-PCS | Mod: S$GLB,,, | Performed by: FAMILY MEDICINE

## 2019-09-30 PROCEDURE — 90662 IIV NO PRSV INCREASED AG IM: CPT | Mod: S$GLB,,, | Performed by: FAMILY MEDICINE

## 2019-09-30 PROCEDURE — 99999 PR PBB SHADOW E&M-EST. PATIENT-LVL III: ICD-10-PCS | Mod: PBBFAC,,, | Performed by: FAMILY MEDICINE

## 2019-09-30 PROCEDURE — 99214 OFFICE O/P EST MOD 30 MIN: CPT | Mod: 25,S$GLB,, | Performed by: FAMILY MEDICINE

## 2019-09-30 PROCEDURE — G0008 ADMIN INFLUENZA VIRUS VAC: HCPCS | Mod: S$GLB,,, | Performed by: FAMILY MEDICINE

## 2019-09-30 PROCEDURE — 99214 PR OFFICE/OUTPT VISIT, EST, LEVL IV, 30-39 MIN: ICD-10-PCS | Mod: 25,S$GLB,, | Performed by: FAMILY MEDICINE

## 2019-09-30 RX ORDER — DULOXETIN HYDROCHLORIDE 30 MG/1
CAPSULE, DELAYED RELEASE ORAL
COMMUNITY

## 2019-09-30 RX ORDER — MUPIROCIN 20 MG/G
OINTMENT TOPICAL 3 TIMES DAILY
Qty: 22 G | Refills: 1 | Status: SHIPPED | OUTPATIENT
Start: 2019-09-30

## 2019-09-30 RX ORDER — BACLOFEN 10 MG/1
TABLET ORAL
COMMUNITY

## 2019-09-30 RX ORDER — MINOCYCLINE HYDROCHLORIDE 100 MG/1
100 CAPSULE ORAL 2 TIMES DAILY
Qty: 28 CAPSULE | Refills: 0 | Status: SHIPPED | OUTPATIENT
Start: 2019-09-30 | End: 2019-10-14

## 2019-09-30 RX ORDER — ISOSORBIDE MONONITRATE 30 MG/1
TABLET, EXTENDED RELEASE ORAL
COMMUNITY

## 2019-09-30 RX ORDER — LANCETS 33 GAUGE
EACH MISCELLANEOUS
COMMUNITY
Start: 2019-08-15

## 2019-09-30 RX ORDER — IMIQUIMOD 37.5 MG/G
CREAM TOPICAL
COMMUNITY

## 2019-09-30 RX ORDER — LANCETS 30 GAUGE
EACH MISCELLANEOUS
COMMUNITY
Start: 2019-08-15

## 2019-09-30 RX ORDER — METOPROLOL SUCCINATE 50 MG/1
TABLET, EXTENDED RELEASE ORAL
COMMUNITY

## 2019-09-30 NOTE — PROGRESS NOTES
"Subjective:       Patient ID: Evita Cuevas is a 89 y.o. female.    Chief Complaint: Suture / Staple Removal (patient needs staples removed from back of head)    Tripped and fell at home a week ago, sustained occipital scalp laceration, stapled in the emergency room.  No loss of consciousness.  No headaches or blurry vision.  Need staple removal  Also complains of left lower leg sores and redness for the past week or 2.  No known injury.  No fevers or chills.    Review of Systems   Constitutional: Negative for fever.   Eyes: Negative for visual disturbance.   Respiratory: Negative for shortness of breath.    Cardiovascular: Positive for leg swelling. Negative for chest pain.   Skin: Positive for color change and wound.   Neurological: Negative for seizures, syncope and headaches.       Objective:      Vitals:    09/30/19 1236   BP: 132/68   BP Location: Right arm   Patient Position: Sitting   BP Method: Large (Manual)   Pulse: 70   Resp: 16   Temp: 98.6 °F (37 °C)   TempSrc: Oral   SpO2: 95%   Weight: 80.9 kg (178 lb 4.8 oz)   Height: 5' 3" (1.6 m)     Physical Exam   Constitutional: She is oriented to person, place, and time. She appears well-developed and well-nourished.   HENT:   Healed occipital scalp laceration with 2 skin staples present   Cardiovascular: Normal rate, regular rhythm and normal heart sounds.   Pulmonary/Chest: Effort normal and breath sounds normal.   Musculoskeletal: She exhibits edema (1+).   Neurological: She is alert and oriented to person, place, and time.   Skin: Skin is warm and dry.        Nursing note and vitals reviewed.      Assessment:       1. Scalp laceration, subsequent encounter    2. Need for vaccination    3. Left leg cellulitis    4. Skin ulcer of left lower leg, limited to breakdown of skin        Plan:     Scalp laceration, subsequent encounter  Skin staples removed without incident.  Routine wound care  Need for vaccination  -     Influenza - High Dose (65+) (PF) " (IM)    Left leg cellulitis  -     minocycline (MINOCIN,DYNACIN) 100 MG capsule; Take 1 capsule (100 mg total) by mouth 2 (two) times daily. for 14 days  Dispense: 28 capsule; Refill: 0  Elevate legs as much as possible.  Return for increasing pain, fever any other concerns  Skin ulcer of left lower leg, limited to breakdown of skin  -     mupirocin (BACTROBAN) 2 % ointment; Apply topically 3 (three) times daily.  Dispense: 22 g; Refill: 1          Medication List with Changes/Refills   New Medications    MINOCYCLINE (MINOCIN,DYNACIN) 100 MG CAPSULE    Take 1 capsule (100 mg total) by mouth 2 (two) times daily. for 14 days    MUPIROCIN (BACTROBAN) 2 % OINTMENT    Apply topically 3 (three) times daily.   Current Medications    ALBUTEROL (PROVENTIL/VENTOLIN HFA) 90 MCG/ACTUATION INHALER    Inhale 1-2 puffs into the lungs every 6 (six) hours as needed for Wheezing. Rescue    AMLODIPINE (NORVASC) 10 MG TABLET    Take 1 tablet (10 mg total) by mouth once daily.    ASPIRIN (ECOTRIN) 81 MG EC TABLET    Take 81 mg by mouth once daily.    ATORVASTATIN (LIPITOR) 20 MG TABLET    Take 20 mg by mouth once daily.    BACLOFEN (LIORESAL) 10 MG TABLET    baclofen 10 mg tablet    BLOOD SUGAR DIAGNOSTIC STRP    To check BG 2 times daily, to use with insurance preferred meter    BLOOD-GLUCOSE METER KIT    To check BG 2 times daily, to use with insurance preferred meter    BRIMONIDINE 0.1% (ALPHAGAN P) 0.1 % DROP    Place 1 drop into both eyes 3 (three) times daily.    CARBIDOPA-LEVODOPA (PARCOPA)  MG PER DISINTEGRATING TABLET    Take 1 tablet by mouth 3 (three) times daily.    CEPHALEXIN (KEFLEX) 500 MG CAPSULE    Take 1 capsule (500 mg total) by mouth 4 (four) times daily. for 7 days    CHOLECALCIFEROL, VITAMIN D3, (VITAMIN D3) 1,000 UNIT CAPSULE    Take 2 capsules (2,000 Units total) by mouth once daily.    CITALOPRAM (CELEXA) 10 MG TABLET    Take 1 tablet (10 mg total) by mouth once daily.    DIAZEPAM (VALIUM) 2 MG TABLET     Take 1 tablet (2 mg total) by mouth every 12 (twelve) hours as needed (vertigo).    DULOXETINE (CYMBALTA) 30 MG CAPSULE    duloxetine 30 mg capsule,delayed release    FLUTICASONE (FLONASE) 50 MCG/ACTUATION NASAL SPRAY    1 spray by Each Nare route once daily.    FUROSEMIDE (LASIX) 20 MG TABLET    TAKE 1 TABLET BY MOUTH ONCE DAILY    GABAPENTIN (NEURONTIN) 100 MG CAPSULE    TAKE 1 CAPSULE BY MOUTH THREE TIMES A DAY    IMIQUIMOD (ZYCLARA) 3.75 % CRPK    Zyclara 3.75 % topical cream in a pump   APPLY ON SKIN TO PRECANCERS EVERY NIGHT FOR 2 WEEKS. OFF 2 WEEKS THEN RESUME FOR 2 MORE WEEKS.    ISOSORBIDE MONONITRATE (IMDUR) 30 MG 24 HR TABLET    Take 1 tablet (30 mg total) by mouth once daily.    ISOSORBIDE MONONITRATE (IMDUR) 30 MG 24 HR TABLET    isosorbide mononitrate ER 30 mg tablet,extended release 24 hr    LANCETS MISC    To check BG 2 times daily, to use with insurance preferred meter    LISINOPRIL (PRINIVIL,ZESTRIL) 20 MG TABLET    Take 1 tablet (20 mg total) by mouth once daily.    MELOXICAM (MOBIC) 7.5 MG TABLET    Take 7.5 mg by mouth once daily.    METFORMIN (GLUCOPHAGE) 1000 MG TABLET    Take 1 tablet (1,000 mg total) by mouth 2 (two) times daily with meals.    METHYLPREDNISOLONE (MEDROL DOSEPACK) 4 MG TABLET    As directed    METOPROLOL SUCCINATE (TOPROL-XL) 50 MG 24 HR TABLET    metoprolol succinate ER 50 mg tablet,extended release 24 hr    METOPROLOL TARTRATE (LOPRESSOR) 50 MG TABLET    Take 1 tablet (50 mg total) by mouth 2 (two) times daily.    NITROGLYCERIN (NITROSTAT) 0.4 MG SL TABLET    Place 1 tablet (0.4 mg total) under the tongue every 5 (five) minutes as needed for Chest pain.    ONETOUCH DELICA PLUS LANCET 30 GAUGE MISC        ONETOUCH ULTRA2 METER MIS        ROPINIROLE (REQUIP) 1 MG TABLET    Take 2 tablets (2 mg total) by mouth 2 (two) times daily.    SITAGLIPTIN (JANUVIA) 100 MG TAB    Take 1 tablet (100 mg total) by mouth once daily.    SPIRONOLACTONE (ALDACTONE) 25 MG TABLET    TAKE 1  TABLET BY MOUTH ONCE DAILY    TRAMADOL (ULTRAM) 50 MG TABLET    Take 1 tablet (50 mg total) by mouth every 8 (eight) hours as needed for Pain.    TRIAMCINOLONE ACETONIDE 0.1% (KENALOG) 0.1 % OINTMENT    Apply topically 2 (two) times daily.

## 2019-09-30 NOTE — PROGRESS NOTES
Verified pt ID using name and . Verified allergies. Administered high dose flu vaccine in left deltoid per physician order using aseptic technique. Aspirated and no blood return noted. Pt tolerated well with no adverse reactions noted.

## 2019-10-30 ENCOUNTER — TELEPHONE (OUTPATIENT)
Dept: PRIMARY CARE CLINIC | Facility: CLINIC | Age: 84
End: 2019-10-30

## 2019-10-30 DIAGNOSIS — S30.0XXS CONTUSION OF COCCYX, SEQUELA: Primary | ICD-10-CM

## 2019-10-30 DIAGNOSIS — Z74.09 LIMITED MOBILITY: ICD-10-CM

## 2019-10-30 NOTE — TELEPHONE ENCOUNTER
Spoke to Christal, patient needs a hospital bed with trapeze, split rails, and a low air loss mattress. Not a hospital bed with trampolines. States patient has injured coccyx and still has some pain from it. Christal thinks a regular mattress may be problematic for the patient.

## 2019-10-30 NOTE — TELEPHONE ENCOUNTER
----- Message from Nick Gonzalez sent at 10/30/2019 11:56 AM CDT -----  Contact: Christal [facility] [phone]  Christal needs an order for Hospital bed with trampolines, please advise.

## 2019-10-31 NOTE — TELEPHONE ENCOUNTER
----- Message from Nithin Hernandez sent at 10/31/2019 11:10 AM CDT -----  Contact: Christal Cardona [phone]  [facility] would like for the office to resend the orders to Fax [phone], did not receive the previous fax. Christal Cardona [phone]    Please call an advise  Thank you

## 2019-11-12 ENCOUNTER — TELEPHONE (OUTPATIENT)
Dept: HOME HEALTH SERVICES | Facility: HOSPITAL | Age: 84
End: 2019-11-12

## 2019-11-15 ENCOUNTER — TELEPHONE (OUTPATIENT)
Dept: DIABETES | Facility: CLINIC | Age: 84
End: 2019-11-15

## 2025-05-14 NOTE — TELEPHONE ENCOUNTER
----- Message from Kavitha Angeles sent at 12/14/2018  8:36 AM CST -----  Contact: self  Patient is calling regarding the prescription for dizziness medication. Patient states the pharmacy needs a call from the doctor before the can fill it. Please call [facility] pharmacy. Please call patient when done at [phone]. Thanks!  [id number] - [place] - [address]  [address] [address]  Phone: [phone] Fax: [phone]          scds